# Patient Record
Sex: MALE | Race: BLACK OR AFRICAN AMERICAN | NOT HISPANIC OR LATINO | Employment: OTHER | RURAL
[De-identification: names, ages, dates, MRNs, and addresses within clinical notes are randomized per-mention and may not be internally consistent; named-entity substitution may affect disease eponyms.]

---

## 2020-03-11 ENCOUNTER — HISTORICAL (OUTPATIENT)
Dept: ADMINISTRATIVE | Facility: HOSPITAL | Age: 70
End: 2020-03-11

## 2020-03-11 LAB
ALBUMIN SERPL BCP-MCNC: 3.4 G/DL (ref 3.5–5)
ALBUMIN/GLOB SERPL: 0.9 {RATIO}
ALP SERPL-CCNC: 78 U/L (ref 45–115)
ALT SERPL W P-5'-P-CCNC: 34 U/L (ref 16–61)
AMYLASE SERPL-CCNC: 151 U/L (ref 25–115)
APTT PPP: 28.4 SECONDS (ref 25.2–37.3)
AST SERPL W P-5'-P-CCNC: 34 U/L (ref 15–37)
BASOPHILS # BLD AUTO: 0.01 X10E3/UL (ref 0–0.2)
BASOPHILS NFR BLD AUTO: 0.2 % (ref 0–1)
BILIRUB SERPL-MCNC: 0.6 MG/DL (ref 0–1.2)
BILIRUB UR QL STRIP: NEGATIVE MG/DL
BUN SERPL-MCNC: 7 MG/DL (ref 7–18)
BUN/CREAT SERPL: 7.4
CALCIUM SERPL-MCNC: 8.5 MG/DL (ref 8.5–10.1)
CHLORIDE SERPL-SCNC: 105 MMOL/L (ref 98–107)
CK MB SERPL-MCNC: <1 NG/ML (ref 1–3.6)
CK SERPL-CCNC: 142 U/L (ref 39–308)
CLARITY UR: CLEAR
CO2 SERPL-SCNC: 26 MMOL/L (ref 21–32)
COLOR UR: ABNORMAL
CREAT SERPL-MCNC: 0.95 MG/DL (ref 0.7–1.3)
EOSINOPHIL # BLD AUTO: 0.1 X10E3/UL (ref 0–0.5)
EOSINOPHIL NFR BLD AUTO: 2.1 % (ref 1–4)
EOSINOPHIL NFR BLD MANUAL: 1 % (ref 1–4)
ERYTHROCYTE [DISTWIDTH] IN BLOOD BY AUTOMATED COUNT: 11.4 % (ref 11.5–14.5)
GLOBULIN SER-MCNC: 3.8 G/DL (ref 2–4)
GLUCOSE SERPL-MCNC: 138 MG/DL (ref 74–106)
GLUCOSE UR STRIP-MCNC: NEGATIVE MG/DL
HCT VFR BLD AUTO: 39.6 % (ref 40–54)
HGB BLD-MCNC: 13.1 G/DL (ref 13.5–18)
IMM GRANULOCYTES # BLD AUTO: 0 X10E3/UL (ref 0–0.04)
IMM GRANULOCYTES NFR BLD: 0 % (ref 0–0.4)
INR BLD: 1 (ref 0–3.3)
KETONES UR STRIP-SCNC: NEGATIVE MG/DL
LEUKOCYTE ESTERASE UR QL STRIP: NEGATIVE LEU/UL
LIPASE SERPL-CCNC: 491 U/L (ref 73–393)
LYMPHOCYTES # BLD AUTO: 3.26 X10E3/UL (ref 1–4.8)
LYMPHOCYTES NFR BLD AUTO: 67.6 % (ref 27–41)
LYMPHOCYTES NFR BLD MANUAL: 71 % (ref 27–41)
MAGNESIUM SERPL-MCNC: 2.2 MG/DL (ref 1.7–2.3)
MCH RBC QN AUTO: 33.2 PG (ref 27–31)
MCHC RBC AUTO-ENTMCNC: 33.1 G/DL (ref 32–36)
MCV RBC AUTO: 100.5 FL (ref 80–96)
MONOCYTES # BLD AUTO: 0.38 X10E3/UL (ref 0–0.8)
MONOCYTES NFR BLD AUTO: 7.9 % (ref 2–6)
MONOCYTES NFR BLD MANUAL: 7 % (ref 2–6)
MPC BLD CALC-MCNC: 10.2 FL (ref 9.4–12.4)
MYOGLOBIN SERPL-MCNC: 64 NG/ML (ref 16–116)
NEUTROPHILS # BLD AUTO: 1.07 X10E3/UL (ref 1.8–7.7)
NEUTROPHILS NFR BLD AUTO: 22.2 % (ref 53–65)
NEUTS BAND NFR BLD MANUAL: 1 % (ref 1–5)
NEUTS SEG NFR BLD MANUAL: 20 % (ref 50–62)
NITRITE UR QL STRIP: NEGATIVE
NRBC # BLD AUTO: 0 X10E3/UL (ref 0–0)
NRBC, AUTO (.00): 0 /100 (ref 0–0)
PH UR STRIP: 7.5 PH UNITS (ref 5–8)
PLATELET # BLD AUTO: 134 X10E3/UL (ref 150–400)
PLATELET MORPHOLOGY: ABNORMAL
POTASSIUM SERPL-SCNC: 3.4 MMOL/L (ref 3.5–5.1)
PROT SERPL-MCNC: 7.2 G/DL (ref 6.4–8.2)
PROT UR QL STRIP: NEGATIVE MG/DL
PROTHROMBIN TIME: 13.3 SECONDS (ref 11.7–14.7)
RBC # BLD AUTO: 3.94 X10E6/UL (ref 4.6–6.2)
RBC # UR STRIP: NEGATIVE ERY/UL
RBC MORPH BLD: NORMAL
SODIUM SERPL-SCNC: 139 MMOL/L (ref 136–145)
SP GR UR STRIP: 1.01 (ref 1–1.03)
TROPONIN I SERPL-MCNC: <0.017 NG/ML (ref 0–0.06)
UROBILINOGEN UR STRIP-ACNC: 1 EU/DL
WBC # BLD AUTO: 4.82 X10E3/UL (ref 4.5–11)

## 2021-06-03 ENCOUNTER — OFFICE VISIT (OUTPATIENT)
Dept: FAMILY MEDICINE | Facility: CLINIC | Age: 71
End: 2021-06-03
Payer: COMMERCIAL

## 2021-06-03 ENCOUNTER — TELEPHONE (OUTPATIENT)
Dept: FAMILY MEDICINE | Facility: CLINIC | Age: 71
End: 2021-06-03

## 2021-06-03 VITALS
DIASTOLIC BLOOD PRESSURE: 80 MMHG | HEIGHT: 62 IN | SYSTOLIC BLOOD PRESSURE: 130 MMHG | TEMPERATURE: 99 F | BODY MASS INDEX: 28.71 KG/M2 | HEART RATE: 67 BPM | OXYGEN SATURATION: 98 % | WEIGHT: 156 LBS

## 2021-06-03 DIAGNOSIS — J44.9 CHRONIC OBSTRUCTIVE PULMONARY DISEASE, UNSPECIFIED COPD TYPE: ICD-10-CM

## 2021-06-03 DIAGNOSIS — M19.012 PRIMARY OSTEOARTHRITIS OF LEFT SHOULDER: Primary | ICD-10-CM

## 2021-06-03 DIAGNOSIS — I10 ESSENTIAL HYPERTENSION: ICD-10-CM

## 2021-06-03 LAB
ANION GAP SERPL CALCULATED.3IONS-SCNC: 12 MMOL/L (ref 7–16)
BASOPHILS # BLD AUTO: 0.03 K/UL (ref 0–0.2)
BASOPHILS NFR BLD AUTO: 0.6 % (ref 0–1)
BUN SERPL-MCNC: 10 MG/DL (ref 7–18)
BUN/CREAT SERPL: 11 (ref 6–20)
CALCIUM SERPL-MCNC: 8.7 MG/DL (ref 8.5–10.1)
CHLORIDE SERPL-SCNC: 105 MMOL/L (ref 98–107)
CHOLEST SERPL-MCNC: 148 MG/DL (ref 0–200)
CHOLEST/HDLC SERPL: 3.9 {RATIO}
CO2 SERPL-SCNC: 26 MMOL/L (ref 21–32)
CREAT SERPL-MCNC: 0.95 MG/DL (ref 0.7–1.3)
DIFFERENTIAL METHOD BLD: ABNORMAL
EOSINOPHIL # BLD AUTO: 0.08 K/UL (ref 0–0.5)
EOSINOPHIL NFR BLD AUTO: 1.5 % (ref 1–4)
ERYTHROCYTE [DISTWIDTH] IN BLOOD BY AUTOMATED COUNT: 12 % (ref 11.5–14.5)
GLUCOSE SERPL-MCNC: 95 MG/DL (ref 74–106)
HCT VFR BLD AUTO: 40.1 % (ref 40–54)
HDLC SERPL-MCNC: 38 MG/DL (ref 40–60)
HGB BLD-MCNC: 13.3 G/DL (ref 13.5–18)
IMM GRANULOCYTES # BLD AUTO: 0.01 K/UL (ref 0–0.04)
IMM GRANULOCYTES NFR BLD: 0.2 % (ref 0–0.4)
LDLC SERPL CALC-MCNC: 96 MG/DL
LDLC/HDLC SERPL: 2.5 {RATIO}
LYMPHOCYTES # BLD AUTO: 3.13 K/UL (ref 1–4.8)
LYMPHOCYTES NFR BLD AUTO: 60.3 % (ref 27–41)
MCH RBC QN AUTO: 32.3 PG (ref 27–31)
MCHC RBC AUTO-ENTMCNC: 33.2 G/DL (ref 32–36)
MCV RBC AUTO: 97.3 FL (ref 80–96)
MONOCYTES # BLD AUTO: 0.51 K/UL (ref 0–0.8)
MONOCYTES NFR BLD AUTO: 9.8 % (ref 2–6)
MPC BLD CALC-MCNC: 10.9 FL (ref 9.4–12.4)
NEUTROPHILS # BLD AUTO: 1.43 K/UL (ref 1.8–7.7)
NEUTROPHILS NFR BLD AUTO: 27.6 % (ref 53–65)
NONHDLC SERPL-MCNC: 110 MG/DL
NRBC # BLD AUTO: 0 X10E3/UL
NRBC, AUTO (.00): 0 %
PLATELET # BLD AUTO: 143 K/UL (ref 150–400)
POTASSIUM SERPL-SCNC: 3.7 MMOL/L (ref 3.5–5.1)
RBC # BLD AUTO: 4.12 M/UL (ref 4.6–6.2)
SODIUM SERPL-SCNC: 139 MMOL/L (ref 136–145)
TRIGL SERPL-MCNC: 70 MG/DL (ref 35–150)
VLDLC SERPL-MCNC: 14 MG/DL
WBC # BLD AUTO: 5.19 K/UL (ref 4.5–11)

## 2021-06-03 PROCEDURE — 96372 PR INJECTION,THERAP/PROPH/DIAG2ST, IM OR SUBCUT: ICD-10-PCS | Mod: ,,, | Performed by: FAMILY MEDICINE

## 2021-06-03 PROCEDURE — 80061 LIPID PANEL: ICD-10-PCS | Mod: ,,, | Performed by: CLINICAL MEDICAL LABORATORY

## 2021-06-03 PROCEDURE — 80048 BASIC METABOLIC PANEL: ICD-10-PCS | Mod: ,,, | Performed by: CLINICAL MEDICAL LABORATORY

## 2021-06-03 PROCEDURE — 99214 PR OFFICE/OUTPT VISIT, EST, LEVL IV, 30-39 MIN: ICD-10-PCS | Mod: 25,,, | Performed by: FAMILY MEDICINE

## 2021-06-03 PROCEDURE — 80061 LIPID PANEL: CPT | Mod: ,,, | Performed by: CLINICAL MEDICAL LABORATORY

## 2021-06-03 PROCEDURE — 99214 OFFICE O/P EST MOD 30 MIN: CPT | Mod: 25,,, | Performed by: FAMILY MEDICINE

## 2021-06-03 PROCEDURE — 80048 BASIC METABOLIC PNL TOTAL CA: CPT | Mod: ,,, | Performed by: CLINICAL MEDICAL LABORATORY

## 2021-06-03 PROCEDURE — 85025 COMPLETE CBC W/AUTO DIFF WBC: CPT | Mod: ,,, | Performed by: CLINICAL MEDICAL LABORATORY

## 2021-06-03 PROCEDURE — 96372 THER/PROPH/DIAG INJ SC/IM: CPT | Mod: ,,, | Performed by: FAMILY MEDICINE

## 2021-06-03 PROCEDURE — 85025 CBC WITH DIFFERENTIAL: ICD-10-PCS | Mod: ,,, | Performed by: CLINICAL MEDICAL LABORATORY

## 2021-06-03 RX ORDER — ALBUTEROL SULFATE 90 UG/1
1 AEROSOL, METERED RESPIRATORY (INHALATION) DAILY PRN
COMMUNITY
Start: 2021-01-29 | End: 2021-06-03 | Stop reason: SDUPTHER

## 2021-06-03 RX ORDER — AMLODIPINE BESYLATE 10 MG/1
10 TABLET ORAL DAILY
Qty: 90 TABLET | Refills: 0 | Status: SHIPPED | OUTPATIENT
Start: 2021-06-03 | End: 2021-11-29 | Stop reason: SDUPTHER

## 2021-06-03 RX ORDER — ALBUTEROL SULFATE 90 UG/1
1 AEROSOL, METERED RESPIRATORY (INHALATION) DAILY PRN
Qty: 18 G | Refills: 1 | Status: SHIPPED | OUTPATIENT
Start: 2021-06-03 | End: 2021-09-02 | Stop reason: SDUPTHER

## 2021-06-03 RX ORDER — NAPROXEN 500 MG/1
500 TABLET ORAL 2 TIMES DAILY PRN
Qty: 30 TABLET | Refills: 2 | Status: SHIPPED | OUTPATIENT
Start: 2021-06-03 | End: 2022-10-18 | Stop reason: SDUPTHER

## 2021-06-03 RX ORDER — KETOROLAC TROMETHAMINE 30 MG/ML
30 INJECTION, SOLUTION INTRAMUSCULAR; INTRAVENOUS
Status: COMPLETED | OUTPATIENT
Start: 2021-06-03 | End: 2021-06-03

## 2021-06-03 RX ORDER — AMLODIPINE BESYLATE 10 MG/1
10 TABLET ORAL DAILY
COMMUNITY
Start: 2021-05-13 | End: 2021-06-03 | Stop reason: SDUPTHER

## 2021-06-03 RX ORDER — ACETAMINOPHEN AND CODEINE PHOSPHATE 300; 30 MG/1; MG/1
1 TABLET ORAL EVERY 6 HOURS PRN
COMMUNITY
Start: 2021-04-06 | End: 2022-10-18 | Stop reason: ALTCHOICE

## 2021-06-03 RX ADMIN — KETOROLAC TROMETHAMINE 30 MG: 30 INJECTION, SOLUTION INTRAMUSCULAR; INTRAVENOUS at 08:06

## 2021-09-02 ENCOUNTER — TELEPHONE (OUTPATIENT)
Dept: FAMILY MEDICINE | Facility: CLINIC | Age: 71
End: 2021-09-02

## 2021-09-02 ENCOUNTER — OFFICE VISIT (OUTPATIENT)
Dept: FAMILY MEDICINE | Facility: CLINIC | Age: 71
End: 2021-09-02
Payer: COMMERCIAL

## 2021-09-02 VITALS
HEART RATE: 60 BPM | TEMPERATURE: 99 F | OXYGEN SATURATION: 99 % | BODY MASS INDEX: 27.6 KG/M2 | WEIGHT: 150 LBS | DIASTOLIC BLOOD PRESSURE: 60 MMHG | SYSTOLIC BLOOD PRESSURE: 128 MMHG | HEIGHT: 62 IN

## 2021-09-02 DIAGNOSIS — J44.9 CHRONIC OBSTRUCTIVE PULMONARY DISEASE, UNSPECIFIED COPD TYPE: ICD-10-CM

## 2021-09-02 DIAGNOSIS — R05.9 COUGH: Primary | ICD-10-CM

## 2021-09-02 DIAGNOSIS — I10 ESSENTIAL HYPERTENSION: ICD-10-CM

## 2021-09-02 PROCEDURE — 99214 PR OFFICE/OUTPT VISIT, EST, LEVL IV, 30-39 MIN: ICD-10-PCS | Mod: ,,, | Performed by: FAMILY MEDICINE

## 2021-09-02 PROCEDURE — 99214 OFFICE O/P EST MOD 30 MIN: CPT | Mod: ,,, | Performed by: FAMILY MEDICINE

## 2021-09-02 RX ORDER — PREDNISONE 20 MG/1
20 TABLET ORAL DAILY
Qty: 5 TABLET | Refills: 0 | Status: SHIPPED | OUTPATIENT
Start: 2021-09-02 | End: 2022-10-18 | Stop reason: ALTCHOICE

## 2021-09-02 RX ORDER — AZITHROMYCIN 250 MG/1
TABLET, FILM COATED ORAL
Qty: 6 TABLET | Refills: 0 | Status: SHIPPED | OUTPATIENT
Start: 2021-09-02 | End: 2021-09-07

## 2021-09-02 RX ORDER — ALBUTEROL SULFATE 90 UG/1
1 AEROSOL, METERED RESPIRATORY (INHALATION) DAILY PRN
Qty: 18 G | Refills: 1 | Status: SHIPPED | OUTPATIENT
Start: 2021-09-02 | End: 2022-02-15 | Stop reason: SDUPTHER

## 2021-09-02 RX ORDER — BENZONATATE 100 MG/1
CAPSULE ORAL
Qty: 30 CAPSULE | Refills: 0 | Status: SHIPPED | OUTPATIENT
Start: 2021-09-02 | End: 2022-01-07 | Stop reason: SDUPTHER

## 2021-11-29 RX ORDER — AMLODIPINE BESYLATE 10 MG/1
10 TABLET ORAL DAILY
Qty: 90 TABLET | Refills: 0 | Status: SHIPPED | OUTPATIENT
Start: 2021-11-29 | End: 2022-04-12 | Stop reason: SDUPTHER

## 2022-01-07 ENCOUNTER — OFFICE VISIT (OUTPATIENT)
Dept: FAMILY MEDICINE | Facility: CLINIC | Age: 72
End: 2022-01-07
Payer: COMMERCIAL

## 2022-01-07 VITALS
HEIGHT: 63 IN | WEIGHT: 152 LBS | HEART RATE: 75 BPM | DIASTOLIC BLOOD PRESSURE: 72 MMHG | SYSTOLIC BLOOD PRESSURE: 120 MMHG | OXYGEN SATURATION: 99 % | BODY MASS INDEX: 26.93 KG/M2 | TEMPERATURE: 98 F

## 2022-01-07 DIAGNOSIS — M54.50 CHRONIC BILATERAL LOW BACK PAIN WITHOUT SCIATICA: ICD-10-CM

## 2022-01-07 DIAGNOSIS — G89.29 CHRONIC BILATERAL LOW BACK PAIN WITHOUT SCIATICA: ICD-10-CM

## 2022-01-07 DIAGNOSIS — M19.012 PRIMARY OSTEOARTHRITIS OF LEFT SHOULDER: Primary | ICD-10-CM

## 2022-01-07 PROCEDURE — 3008F BODY MASS INDEX DOCD: CPT | Mod: ,,, | Performed by: FAMILY MEDICINE

## 2022-01-07 PROCEDURE — 1160F PR REVIEW ALL MEDS BY PRESCRIBER/CLIN PHARMACIST DOCUMENTED: ICD-10-PCS | Mod: ,,, | Performed by: FAMILY MEDICINE

## 2022-01-07 PROCEDURE — 3008F PR BODY MASS INDEX (BMI) DOCUMENTED: ICD-10-PCS | Mod: ,,, | Performed by: FAMILY MEDICINE

## 2022-01-07 PROCEDURE — 3078F PR MOST RECENT DIASTOLIC BLOOD PRESSURE < 80 MM HG: ICD-10-PCS | Mod: ,,, | Performed by: FAMILY MEDICINE

## 2022-01-07 PROCEDURE — 3074F PR MOST RECENT SYSTOLIC BLOOD PRESSURE < 130 MM HG: ICD-10-PCS | Mod: ,,, | Performed by: FAMILY MEDICINE

## 2022-01-07 PROCEDURE — 3074F SYST BP LT 130 MM HG: CPT | Mod: ,,, | Performed by: FAMILY MEDICINE

## 2022-01-07 PROCEDURE — 99213 OFFICE O/P EST LOW 20 MIN: CPT | Mod: 25,,, | Performed by: FAMILY MEDICINE

## 2022-01-07 PROCEDURE — 96372 THER/PROPH/DIAG INJ SC/IM: CPT | Mod: ,,, | Performed by: FAMILY MEDICINE

## 2022-01-07 PROCEDURE — 1159F PR MEDICATION LIST DOCUMENTED IN MEDICAL RECORD: ICD-10-PCS | Mod: ,,, | Performed by: FAMILY MEDICINE

## 2022-01-07 PROCEDURE — 96372 PR INJECTION,THERAP/PROPH/DIAG2ST, IM OR SUBCUT: ICD-10-PCS | Mod: ,,, | Performed by: FAMILY MEDICINE

## 2022-01-07 PROCEDURE — 99213 PR OFFICE/OUTPT VISIT, EST, LEVL III, 20-29 MIN: ICD-10-PCS | Mod: 25,,, | Performed by: FAMILY MEDICINE

## 2022-01-07 PROCEDURE — 1159F MED LIST DOCD IN RCRD: CPT | Mod: ,,, | Performed by: FAMILY MEDICINE

## 2022-01-07 PROCEDURE — 1160F RVW MEDS BY RX/DR IN RCRD: CPT | Mod: ,,, | Performed by: FAMILY MEDICINE

## 2022-01-07 PROCEDURE — 3078F DIAST BP <80 MM HG: CPT | Mod: ,,, | Performed by: FAMILY MEDICINE

## 2022-01-07 RX ORDER — NAPROXEN 500 MG/1
500 TABLET ORAL 2 TIMES DAILY
Qty: 20 TABLET | Refills: 0 | Status: SHIPPED | OUTPATIENT
Start: 2022-01-07 | End: 2022-04-14 | Stop reason: SDUPTHER

## 2022-01-07 RX ORDER — BENZONATATE 100 MG/1
CAPSULE ORAL
Qty: 30 CAPSULE | Refills: 0 | Status: SHIPPED | OUTPATIENT
Start: 2022-01-07 | End: 2023-03-21 | Stop reason: ALTCHOICE

## 2022-01-07 RX ORDER — KETOROLAC TROMETHAMINE 30 MG/ML
30 INJECTION, SOLUTION INTRAMUSCULAR; INTRAVENOUS
Status: COMPLETED | OUTPATIENT
Start: 2022-01-07 | End: 2022-01-07

## 2022-01-07 RX ADMIN — KETOROLAC TROMETHAMINE 30 MG: 30 INJECTION, SOLUTION INTRAMUSCULAR; INTRAVENOUS at 10:01

## 2022-01-07 NOTE — PROGRESS NOTES
[facility] Medicine    Chief Complaint      Chief Complaint   Patient presents with    Arthritis     Back and shoulder arthritis       History of Present Illness      Jeff Starr is a 71 y.o. male with chronic conditions of HTN, COPD, and osteoarthritis who presents today for left shoulder and low back pain.  Patient seen for similar in the past and treated with IM Toradol and p.o. naproxen.  States he noted significant relief of symptoms, but has been out of naproxen.  Denies new injury.    Past Medical History:  Past Medical History:   Diagnosis Date    HTN (hypertension)        Past Surgical History:   has no past surgical history on file.    Social History:  Social History     Tobacco Use    Smoking status: Current Every Day Smoker     Packs/day: 1.00     Types: Cigarettes    Smokeless tobacco: Never Used   Substance Use Topics    Alcohol use: Not Currently    Drug use: Never       I personally reviewed all past medical, surgical, and social.     Review of Systems   Constitutional: Negative for fatigue and fever.   HENT: Negative for ear pain.    Eyes: Negative for pain and visual disturbance.   Respiratory: Negative for chest tightness and shortness of breath.    Cardiovascular: Negative for chest pain and leg swelling.   Gastrointestinal: Negative for abdominal pain.   Genitourinary: Negative for difficulty urinating.   Musculoskeletal: Positive for arthralgias and back pain. Negative for gait problem and myalgias.   Skin: Negative for rash.   Neurological: Negative for dizziness and light-headedness.   Hematological: Does not bruise/bleed easily.        Medications:  Outpatient Encounter Medications as of 1/7/2022   Medication Sig Dispense Refill    acetaminophen-codeine 300-30mg (TYLENOL #3) 300-30 mg Tab Take 1 tablet by mouth every 6 (six) hours as needed.      albuterol (PROVENTIL/VENTOLIN HFA) 90 mcg/actuation inhaler Inhale 1 puff into the lungs daily as needed for Shortness of Breath. 18  "g 1    amLODIPine (NORVASC) 10 MG tablet Take 1 tablet (10 mg total) by mouth once daily. 90 tablet 0    [DISCONTINUED] benzonatate (TESSALON) 100 MG capsule One to two capsules three times daily as needed for cough 30 capsule 0    benzonatate (TESSALON) 100 MG capsule One to two capsules three times daily as needed for cough 30 capsule 0    naproxen (NAPROSYN) 500 MG tablet Take 1 tablet (500 mg total) by mouth 2 (two) times daily as needed (pain). (Patient not taking: Reported on 1/7/2022) 30 tablet 2    naproxen (NAPROSYN) 500 MG tablet Take 1 tablet (500 mg total) by mouth 2 (two) times daily. 20 tablet 0    predniSONE (DELTASONE) 20 MG tablet Take 1 tablet (20 mg total) by mouth once daily. (Patient not taking: Reported on 1/7/2022) 5 tablet 0     Facility-Administered Encounter Medications as of 1/7/2022   Medication Dose Route Frequency Provider Last Rate Last Admin    [COMPLETED] ketorolac injection 30 mg  30 mg Intramuscular 1 time in Clinic/HOD Negrito Soliman, DO   30 mg at 01/07/22 1054       Allergies:  Review of patient's allergies indicates:  No Known Allergies    Health Maintenance:    There is no immunization history on file for this patient.   Health Maintenance   Topic Date Due    Hepatitis C Screening  Never done    TETANUS VACCINE  Never done    Abdominal Aortic Aneurysm Screening  Never done    Lipid Panel  06/03/2026        Physical Exam      Vital Signs  Temp: 97.6 °F (36.4 °C)  Pulse: 75  SpO2: 99 %  BP: 120/72  BP Location: Left arm  Patient Position: Sitting  Height and Weight  Height: 5' 3" (160 cm)  Weight: 68.9 kg (152 lb)  BSA (Calculated - sq m): 1.75 sq meters  BMI (Calculated): 26.9  Weight in (lb) to have BMI = 25: 140.8]    Physical Exam  Constitutional:       Appearance: Normal appearance.   HENT:      Head: Normocephalic.   Eyes:      Conjunctiva/sclera: Conjunctivae normal.      Pupils: Pupils are equal, round, and reactive to light.   Cardiovascular:      Rate " and Rhythm: Normal rate and regular rhythm.      Pulses: Normal pulses.   Pulmonary:      Effort: Pulmonary effort is normal.      Breath sounds: Normal breath sounds.   Abdominal:      General: Bowel sounds are normal. There is no distension.      Palpations: Abdomen is soft.   Musculoskeletal:         General: Normal range of motion.      Left shoulder: Tenderness present.      Lumbar back: Tenderness present.      Right lower leg: No edema.      Left lower leg: No edema.   Skin:     General: Skin is warm and dry.   Neurological:      General: No focal deficit present.      Mental Status: He is alert and oriented to person, place, and time.   Psychiatric:         Mood and Affect: Mood normal.          Laboratory:  CBC:  Recent Labs   Lab 03/11/20  0900 03/11/20  0900 06/03/21  0839   WBC 4.82   < > 5.19   RBC 3.94 L   < > 4.12 L   Hemoglobin 13.1 L   < > 13.3 L   Hematocrit 39.6 L   < > 40.1   Platelet Count 134 L   < > 143 L   .5 H   < > 97.3 H   MCH 33.2 H   < > 32.3 H   MCHC 33.1  --  33.2    < > = values in this interval not displayed.     CMP:  Recent Labs   Lab 03/11/20  0900 03/11/20  0900 06/03/21  0839   Glucose 138 H   < > 95   Calcium 8.5   < > 8.7   Albumin 3.4 L  --   --    Total Protein 7.2  --   --    Sodium 139   < > 139   Potassium 3.4 L   < > 3.7   CO2 26   < > 26   Chloride 105   < > 105   BUN 7   < > 10   Alk Phos 78  --   --    ALT 34  --   --    AST 34  --   --    Bilirubin, Total 0.6  --   --     < > = values in this interval not displayed.     LIPIDS:  Recent Labs   Lab 06/03/21  0839   HDL Cholesterol 38 L   Cholesterol 148   Triglycerides 70   LDL Calculated 96   Cholesterol/HDL Ratio (Risk Factor) 3.9   Non-     TSH:      A1C:        Assessment/Plan     Jeff Starr is a 71 y.o.male with:     1. Primary osteoarthritis of left shoulder  - ketorolac injection 30 mg  - naproxen 500 mg b.i.d. p.r.n.    2. Chronic bilateral low back pain without sciatica  - Tx per  #1    Total time spent face-to-face and non-face-to-face coordinating care for this encounter was: 20 min    Chronic conditions status updated as per HPI.  Other than changes above, cont current medications and maintain follow up with specialists.  Return to clinic PRN.    Negrito Soliman DO  [facility]

## 2022-02-15 RX ORDER — ALBUTEROL SULFATE 90 UG/1
1 AEROSOL, METERED RESPIRATORY (INHALATION) DAILY PRN
Qty: 18 G | Refills: 1 | Status: SHIPPED | OUTPATIENT
Start: 2022-02-15 | End: 2022-04-14 | Stop reason: SDUPTHER

## 2022-04-12 RX ORDER — AMLODIPINE BESYLATE 10 MG/1
10 TABLET ORAL DAILY
Qty: 90 TABLET | Refills: 0 | Status: SHIPPED | OUTPATIENT
Start: 2022-04-12 | End: 2022-07-14 | Stop reason: SDUPTHER

## 2022-04-14 ENCOUNTER — OFFICE VISIT (OUTPATIENT)
Dept: FAMILY MEDICINE | Facility: CLINIC | Age: 72
End: 2022-04-14
Payer: COMMERCIAL

## 2022-04-14 VITALS
OXYGEN SATURATION: 98 % | DIASTOLIC BLOOD PRESSURE: 70 MMHG | BODY MASS INDEX: 28.35 KG/M2 | WEIGHT: 160 LBS | TEMPERATURE: 98 F | SYSTOLIC BLOOD PRESSURE: 130 MMHG | HEART RATE: 78 BPM | HEIGHT: 63 IN

## 2022-04-14 DIAGNOSIS — J44.9 CHRONIC OBSTRUCTIVE PULMONARY DISEASE, UNSPECIFIED COPD TYPE: ICD-10-CM

## 2022-04-14 DIAGNOSIS — M25.50 MULTIPLE JOINT PAIN: ICD-10-CM

## 2022-04-14 DIAGNOSIS — I10 ESSENTIAL HYPERTENSION: Primary | ICD-10-CM

## 2022-04-14 LAB
ANION GAP SERPL CALCULATED.3IONS-SCNC: 7 MMOL/L (ref 7–16)
BASOPHILS # BLD AUTO: 0.02 K/UL (ref 0–0.2)
BASOPHILS NFR BLD AUTO: 0.4 % (ref 0–1)
BUN SERPL-MCNC: 13 MG/DL (ref 7–18)
BUN/CREAT SERPL: 14 (ref 6–20)
CALCIUM SERPL-MCNC: 8.5 MG/DL (ref 8.5–10.1)
CHLORIDE SERPL-SCNC: 111 MMOL/L (ref 98–107)
CHOLEST SERPL-MCNC: 151 MG/DL (ref 0–200)
CHOLEST/HDLC SERPL: 4 {RATIO}
CO2 SERPL-SCNC: 26 MMOL/L (ref 21–32)
CREAT SERPL-MCNC: 0.94 MG/DL (ref 0.7–1.3)
CRP SERPL-MCNC: 0.61 MG/DL (ref 0–0.8)
DIFFERENTIAL METHOD BLD: ABNORMAL
EOSINOPHIL # BLD AUTO: 0.12 K/UL (ref 0–0.5)
EOSINOPHIL NFR BLD AUTO: 2.2 % (ref 1–4)
ERYTHROCYTE [DISTWIDTH] IN BLOOD BY AUTOMATED COUNT: 11.9 % (ref 11.5–14.5)
ERYTHROCYTE [SEDIMENTATION RATE] IN BLOOD BY WESTERGREN METHOD: 15 MM/HR (ref 0–20)
GLUCOSE SERPL-MCNC: 95 MG/DL (ref 74–106)
HCT VFR BLD AUTO: 40.2 % (ref 40–54)
HDLC SERPL-MCNC: 38 MG/DL (ref 40–60)
HGB BLD-MCNC: 13.1 G/DL (ref 13.5–18)
IMM GRANULOCYTES # BLD AUTO: 0.02 K/UL (ref 0–0.04)
IMM GRANULOCYTES NFR BLD: 0.4 % (ref 0–0.4)
LDLC SERPL CALC-MCNC: 97 MG/DL
LDLC/HDLC SERPL: 2.6 {RATIO}
LYMPHOCYTES # BLD AUTO: 2.7 K/UL (ref 1–4.8)
LYMPHOCYTES NFR BLD AUTO: 50.1 % (ref 27–41)
MCH RBC QN AUTO: 32.7 PG (ref 27–31)
MCHC RBC AUTO-ENTMCNC: 32.6 G/DL (ref 32–36)
MCV RBC AUTO: 100.2 FL (ref 80–96)
MONOCYTES # BLD AUTO: 0.59 K/UL (ref 0–0.8)
MONOCYTES NFR BLD AUTO: 10.9 % (ref 2–6)
MPC BLD CALC-MCNC: 11.6 FL (ref 9.4–12.4)
NEUTROPHILS # BLD AUTO: 1.94 K/UL (ref 1.8–7.7)
NEUTROPHILS NFR BLD AUTO: 36 % (ref 53–65)
NONHDLC SERPL-MCNC: 113 MG/DL
NRBC # BLD AUTO: 0 X10E3/UL
NRBC, AUTO (.00): 0 %
PLATELET # BLD AUTO: 144 K/UL (ref 150–400)
POTASSIUM SERPL-SCNC: 4.2 MMOL/L (ref 3.5–5.1)
RBC # BLD AUTO: 4.01 M/UL (ref 4.6–6.2)
RHEUMATOID FACT SER NEPH-ACNC: <10 IU/ML (ref 0–15)
SODIUM SERPL-SCNC: 140 MMOL/L (ref 136–145)
TRIGL SERPL-MCNC: 79 MG/DL (ref 35–150)
VLDLC SERPL-MCNC: 16 MG/DL
WBC # BLD AUTO: 5.39 K/UL (ref 4.5–11)

## 2022-04-14 PROCEDURE — 80048 BASIC METABOLIC PNL TOTAL CA: CPT | Mod: ,,, | Performed by: CLINICAL MEDICAL LABORATORY

## 2022-04-14 PROCEDURE — 1159F MED LIST DOCD IN RCRD: CPT | Mod: ,,, | Performed by: FAMILY MEDICINE

## 2022-04-14 PROCEDURE — 99214 PR OFFICE/OUTPT VISIT, EST, LEVL IV, 30-39 MIN: ICD-10-PCS | Mod: ,,, | Performed by: FAMILY MEDICINE

## 2022-04-14 PROCEDURE — 3078F DIAST BP <80 MM HG: CPT | Mod: ,,, | Performed by: FAMILY MEDICINE

## 2022-04-14 PROCEDURE — 86038 MAYO GENERIC ORDERABLE: ICD-10-PCS | Mod: 90,,, | Performed by: CLINICAL MEDICAL LABORATORY

## 2022-04-14 PROCEDURE — 1159F PR MEDICATION LIST DOCUMENTED IN MEDICAL RECORD: ICD-10-PCS | Mod: ,,, | Performed by: FAMILY MEDICINE

## 2022-04-14 PROCEDURE — 85025 COMPLETE CBC W/AUTO DIFF WBC: CPT | Mod: ,,, | Performed by: CLINICAL MEDICAL LABORATORY

## 2022-04-14 PROCEDURE — 3075F PR MOST RECENT SYSTOLIC BLOOD PRESS GE 130-139MM HG: ICD-10-PCS | Mod: ,,, | Performed by: FAMILY MEDICINE

## 2022-04-14 PROCEDURE — 80061 LIPID PANEL: CPT | Mod: ,,, | Performed by: CLINICAL MEDICAL LABORATORY

## 2022-04-14 PROCEDURE — 1160F PR REVIEW ALL MEDS BY PRESCRIBER/CLIN PHARMACIST DOCUMENTED: ICD-10-PCS | Mod: ,,, | Performed by: FAMILY MEDICINE

## 2022-04-14 PROCEDURE — 3008F BODY MASS INDEX DOCD: CPT | Mod: ,,, | Performed by: FAMILY MEDICINE

## 2022-04-14 PROCEDURE — 85025 CBC WITH DIFFERENTIAL: ICD-10-PCS | Mod: ,,, | Performed by: CLINICAL MEDICAL LABORATORY

## 2022-04-14 PROCEDURE — 3078F PR MOST RECENT DIASTOLIC BLOOD PRESSURE < 80 MM HG: ICD-10-PCS | Mod: ,,, | Performed by: FAMILY MEDICINE

## 2022-04-14 PROCEDURE — 86140 C-REACTIVE PROTEIN: ICD-10-PCS | Mod: ,,, | Performed by: CLINICAL MEDICAL LABORATORY

## 2022-04-14 PROCEDURE — 86038 ANTINUCLEAR ANTIBODIES: CPT | Mod: 90,,, | Performed by: CLINICAL MEDICAL LABORATORY

## 2022-04-14 PROCEDURE — 99214 OFFICE O/P EST MOD 30 MIN: CPT | Mod: ,,, | Performed by: FAMILY MEDICINE

## 2022-04-14 PROCEDURE — 85651 RBC SED RATE NONAUTOMATED: CPT | Mod: ,,, | Performed by: CLINICAL MEDICAL LABORATORY

## 2022-04-14 PROCEDURE — 86431 RHEUMATOID FACTOR QUANT: CPT | Mod: ,,, | Performed by: CLINICAL MEDICAL LABORATORY

## 2022-04-14 PROCEDURE — 85651 SEDIMENTATION RATE, AUTOMATED: ICD-10-PCS | Mod: ,,, | Performed by: CLINICAL MEDICAL LABORATORY

## 2022-04-14 PROCEDURE — 80048 BASIC METABOLIC PANEL: ICD-10-PCS | Mod: ,,, | Performed by: CLINICAL MEDICAL LABORATORY

## 2022-04-14 PROCEDURE — 80061 LIPID PANEL: ICD-10-PCS | Mod: ,,, | Performed by: CLINICAL MEDICAL LABORATORY

## 2022-04-14 PROCEDURE — 86140 C-REACTIVE PROTEIN: CPT | Mod: ,,, | Performed by: CLINICAL MEDICAL LABORATORY

## 2022-04-14 PROCEDURE — 3075F SYST BP GE 130 - 139MM HG: CPT | Mod: ,,, | Performed by: FAMILY MEDICINE

## 2022-04-14 PROCEDURE — 86431 RHEUMATOID QUANTITATIVE: ICD-10-PCS | Mod: ,,, | Performed by: CLINICAL MEDICAL LABORATORY

## 2022-04-14 PROCEDURE — 1160F RVW MEDS BY RX/DR IN RCRD: CPT | Mod: ,,, | Performed by: FAMILY MEDICINE

## 2022-04-14 PROCEDURE — 3008F PR BODY MASS INDEX (BMI) DOCUMENTED: ICD-10-PCS | Mod: ,,, | Performed by: FAMILY MEDICINE

## 2022-04-14 RX ORDER — ALBUTEROL SULFATE 90 UG/1
1 AEROSOL, METERED RESPIRATORY (INHALATION) DAILY PRN
Qty: 18 G | Refills: 1 | Status: SHIPPED | OUTPATIENT
Start: 2022-04-14 | End: 2022-10-18 | Stop reason: SDUPTHER

## 2022-04-14 RX ORDER — NAPROXEN 500 MG/1
500 TABLET ORAL 2 TIMES DAILY
Qty: 20 TABLET | Refills: 2 | Status: SHIPPED | OUTPATIENT
Start: 2022-04-14 | End: 2023-03-21 | Stop reason: SDUPTHER

## 2022-04-14 RX ORDER — NEOMYCIN SULFATE, POLYMYXIN B SULFATE, AND DEXAMETHASONE 3.5; 10000; 1 MG/G; [USP'U]/G; MG/G
OINTMENT OPHTHALMIC
COMMUNITY
Start: 2021-12-21 | End: 2023-03-21 | Stop reason: ALTCHOICE

## 2022-04-14 NOTE — PROGRESS NOTES
[facility] Medicine    Chief Complaint      Chief Complaint   Patient presents with    Follow-up       History of Present Illness      Jeff Starr is a 71 y.o. male with chronic conditions of HTN, COPD, and osteoarthritis who presents today for routine follow up. Today has c/o multiple joint pain. Noted pain in right shoulder and elbow and left hand for 1 week duration. States he was diagnosed with rheumatoid arthritis while being seen at [facility] in [id number]. States rainy weather makes symptoms worse. Denies new injury/trauma. States symptoms improve with PRN naproxen. States other chronic conditions stable.    Past Medical History:  Past Medical History:   Diagnosis Date    HTN (hypertension)        Past Surgical History:   has no past surgical history on file.    Social History:  Social History     Tobacco Use    Smoking status: Current Every Day Smoker     Packs/day: 1.00     Types: Cigarettes    Smokeless tobacco: Never Used   Substance Use Topics    Alcohol use: Not Currently    Drug use: Never       I personally reviewed all past medical, surgical, and social.     Review of Systems   Constitutional: Negative for fatigue and fever.   HENT: Negative for ear pain.    Eyes: Negative for pain and visual disturbance.   Respiratory: Negative for chest tightness and shortness of breath.    Cardiovascular: Negative for chest pain and leg swelling.   Gastrointestinal: Negative for abdominal pain.   Genitourinary: Negative for difficulty urinating.   Musculoskeletal: Positive for arthralgias. Negative for gait problem and myalgias.   Skin: Negative for rash.   Neurological: Negative for dizziness and light-headedness.   Hematological: Does not bruise/bleed easily.        Medications:  Outpatient Encounter Medications as of 4/14/2022   Medication Sig Dispense Refill    acetaminophen-codeine 300-30mg (TYLENOL #3) 300-30 mg Tab Take 1 tablet by mouth every 6 (six) hours as needed.      amLODIPine  "(NORVASC) 10 MG tablet Take 1 tablet (10 mg total) by mouth once daily. 90 tablet 0    benzonatate (TESSALON) 100 MG capsule One to two capsules three times daily as needed for cough 30 capsule 0    naproxen (NAPROSYN) 500 MG tablet Take 1 tablet (500 mg total) by mouth 2 (two) times daily as needed (pain). 30 tablet 2    [DISCONTINUED] albuterol (PROVENTIL/VENTOLIN HFA) 90 mcg/actuation inhaler Inhale 1 puff into the lungs daily as needed for Shortness of Breath. 18 g 1    [DISCONTINUED] naproxen (NAPROSYN) 500 MG tablet Take 1 tablet (500 mg total) by mouth 2 (two) times daily. 20 tablet 0    albuterol (PROVENTIL/VENTOLIN HFA) 90 mcg/actuation inhaler Inhale 1 puff into the lungs daily as needed for Shortness of Breath. 18 g 1    naproxen (NAPROSYN) 500 MG tablet Take 1 tablet (500 mg total) by mouth 2 (two) times daily. 20 tablet 2    neomycin-polymyxin-dexamethasone (DEXACINE) 3.5 mg/g-10,000 unit/g-0.1 % Oint       predniSONE (DELTASONE) 20 MG tablet Take 1 tablet (20 mg total) by mouth once daily. (Patient not taking: Reported on 1/7/2022) 5 tablet 0     No facility-administered encounter medications on file as of 4/14/2022.       Allergies:  Review of patient's allergies indicates:  No Known Allergies    Health Maintenance:    There is no immunization history on file for this patient.   Health Maintenance   Topic Date Due    Hepatitis C Screening  Never done    TETANUS VACCINE  Never done    Abdominal Aortic Aneurysm Screening  Never done    Lipid Panel  06/03/2026        Physical Exam      Vital Signs  Temp: 97.6 °F (36.4 °C)  Pulse: 78  SpO2: 98 %  BP: 130/70  BP Location: Left leg  Patient Position: Sitting  Height and Weight  Height: 5' 3" (160 cm)  Weight: 72.6 kg (160 lb)  BSA (Calculated - sq m): 1.8 sq meters  BMI (Calculated): 28.4  Weight in (lb) to have BMI = 25: 140.8]    Physical Exam  Constitutional:       Appearance: Normal appearance.   HENT:      Head: Normocephalic.   Eyes:      " Conjunctiva/sclera: Conjunctivae normal.      Pupils: Pupils are equal, round, and reactive to light.   Cardiovascular:      Rate and Rhythm: Normal rate and regular rhythm.      Pulses: Normal pulses.   Pulmonary:      Effort: Pulmonary effort is normal.      Breath sounds: Normal breath sounds.   Abdominal:      General: Bowel sounds are normal. There is no distension.      Palpations: Abdomen is soft.   Musculoskeletal:         General: Normal range of motion.      Right lower leg: No edema.      Left lower leg: No edema.   Skin:     General: Skin is warm and dry.   Neurological:      General: No focal deficit present.      Mental Status: He is alert and oriented to person, place, and time.   Psychiatric:         Mood and Affect: Mood normal.          Laboratory:  CBC:  Recent Labs   Lab 03/11/20  0900 06/03/21  0839   WBC 4.82 5.19   RBC 3.94 L 4.12 L   Hemoglobin 13.1 L 13.3 L   Hematocrit 39.6 L 40.1   Platelet Count 134 L 143 L   .5 H 97.3 H   MCH 33.2 H 32.3 H   MCHC 33.1 33.2     CMP:  Recent Labs   Lab 03/11/20  0900 06/03/21  0839   Glucose 138 H 95   Calcium 8.5 8.7   Albumin 3.4 L  --    Total Protein 7.2  --    Sodium 139 139   Potassium 3.4 L 3.7   CO2 26 26   Chloride 105 105   BUN 7 10   Alk Phos 78  --    ALT 34  --    AST 34  --    Bilirubin, Total 0.6  --      LIPIDS:  Recent Labs   Lab 06/03/21  0839   HDL Cholesterol 38 L   Cholesterol 148   Triglycerides 70   LDL Calculated 96   Cholesterol/HDL Ratio (Risk Factor) 3.9   Non-     TSH:      A1C:        Assessment/Plan     Jeff Starr is a 71 y.o.male with:     1. Essential hypertension  - Controlled  - Continue current medications  - Basic Metabolic Panel  - CBC Auto Differential  - Lipid Panel    2. Multiple joint pain  - Continue naproxen 500mg BID PRN  - Discussed possible referral to rheumatology based on labs  - JABIER EIA w/ Reflex to dsDNA/PEPE  - Rheumatoid Quantitative  - C-Reactive Protein  - Sedimentation Rate    3.  Chronic obstructive pulmonary disease, unspecified COPD type  - Stable  - Continue PRN albuterol    Total time spent face-to-face and non-face-to-face coordinating care for this encounter was: 30 min    Chronic conditions status updated as per HPI.  Other than changes above, cont current medications and maintain follow up with specialists.  Return to clinic in 3 months.    Negrito Soliman DO  [facility]

## 2022-04-16 LAB — MAYO GENERIC ORDERABLE RESULT: NORMAL

## 2022-04-19 ENCOUNTER — TELEPHONE (OUTPATIENT)
Dept: FAMILY MEDICINE | Facility: CLINIC | Age: 72
End: 2022-04-19
Payer: COMMERCIAL

## 2022-04-19 NOTE — TELEPHONE ENCOUNTER
----- Message from Negrito Soliman DO sent at 4/18/2022 11:40 AM CDT -----  Labs negative for rheumatoid arthritis. Other labwork stable

## 2022-06-16 ENCOUNTER — IMMUNIZATION (OUTPATIENT)
Dept: FAMILY MEDICINE | Facility: CLINIC | Age: 72
End: 2022-06-16
Payer: COMMERCIAL

## 2022-06-16 DIAGNOSIS — Z23 NEED FOR VACCINATION: Primary | ICD-10-CM

## 2022-06-16 PROCEDURE — 0064A COVID-19, MRNA, LNP-S, PF, 100 MCG/0.25 ML DOSE VACCINE (MODERNA BOOSTER): CPT | Mod: ,,, | Performed by: FAMILY MEDICINE

## 2022-06-16 PROCEDURE — 91306 COVID-19, MRNA, LNP-S, PF, 100 MCG/0.25 ML DOSE VACCINE (MODERNA BOOSTER): CPT | Mod: ,,, | Performed by: FAMILY MEDICINE

## 2022-06-16 PROCEDURE — 0064A COVID-19, MRNA, LNP-S, PF, 100 MCG/0.25 ML DOSE VACCINE (MODERNA BOOSTER): ICD-10-PCS | Mod: ,,, | Performed by: FAMILY MEDICINE

## 2022-06-16 PROCEDURE — 91306 COVID-19, MRNA, LNP-S, PF, 100 MCG/0.25 ML DOSE VACCINE (MODERNA BOOSTER): ICD-10-PCS | Mod: ,,, | Performed by: FAMILY MEDICINE

## 2022-07-14 ENCOUNTER — OFFICE VISIT (OUTPATIENT)
Dept: FAMILY MEDICINE | Facility: CLINIC | Age: 72
End: 2022-07-14
Payer: COMMERCIAL

## 2022-07-14 VITALS
HEART RATE: 59 BPM | DIASTOLIC BLOOD PRESSURE: 72 MMHG | BODY MASS INDEX: 27.11 KG/M2 | TEMPERATURE: 99 F | OXYGEN SATURATION: 97 % | HEIGHT: 63 IN | SYSTOLIC BLOOD PRESSURE: 132 MMHG | WEIGHT: 153 LBS

## 2022-07-14 DIAGNOSIS — G89.29 CHRONIC ELBOW PAIN, LEFT: Primary | ICD-10-CM

## 2022-07-14 DIAGNOSIS — M25.522 CHRONIC ELBOW PAIN, LEFT: Primary | ICD-10-CM

## 2022-07-14 DIAGNOSIS — I10 ESSENTIAL HYPERTENSION: ICD-10-CM

## 2022-07-14 DIAGNOSIS — G56.22 CUBITAL TUNNEL SYNDROME ON LEFT: ICD-10-CM

## 2022-07-14 PROCEDURE — 99214 OFFICE O/P EST MOD 30 MIN: CPT | Mod: ,,, | Performed by: FAMILY MEDICINE

## 2022-07-14 PROCEDURE — 1101F PR PT FALLS ASSESS DOC 0-1 FALLS W/OUT INJ PAST YR: ICD-10-PCS | Mod: ,,, | Performed by: FAMILY MEDICINE

## 2022-07-14 PROCEDURE — 3075F SYST BP GE 130 - 139MM HG: CPT | Mod: ,,, | Performed by: FAMILY MEDICINE

## 2022-07-14 PROCEDURE — 3008F BODY MASS INDEX DOCD: CPT | Mod: ,,, | Performed by: FAMILY MEDICINE

## 2022-07-14 PROCEDURE — 3288F PR FALLS RISK ASSESSMENT DOCUMENTED: ICD-10-PCS | Mod: ,,, | Performed by: FAMILY MEDICINE

## 2022-07-14 PROCEDURE — 3078F PR MOST RECENT DIASTOLIC BLOOD PRESSURE < 80 MM HG: ICD-10-PCS | Mod: ,,, | Performed by: FAMILY MEDICINE

## 2022-07-14 PROCEDURE — 3288F FALL RISK ASSESSMENT DOCD: CPT | Mod: ,,, | Performed by: FAMILY MEDICINE

## 2022-07-14 PROCEDURE — 1101F PT FALLS ASSESS-DOCD LE1/YR: CPT | Mod: ,,, | Performed by: FAMILY MEDICINE

## 2022-07-14 PROCEDURE — 99214 PR OFFICE/OUTPT VISIT, EST, LEVL IV, 30-39 MIN: ICD-10-PCS | Mod: ,,, | Performed by: FAMILY MEDICINE

## 2022-07-14 PROCEDURE — 3075F PR MOST RECENT SYSTOLIC BLOOD PRESS GE 130-139MM HG: ICD-10-PCS | Mod: ,,, | Performed by: FAMILY MEDICINE

## 2022-07-14 PROCEDURE — 1159F PR MEDICATION LIST DOCUMENTED IN MEDICAL RECORD: ICD-10-PCS | Mod: ,,, | Performed by: FAMILY MEDICINE

## 2022-07-14 PROCEDURE — 1159F MED LIST DOCD IN RCRD: CPT | Mod: ,,, | Performed by: FAMILY MEDICINE

## 2022-07-14 PROCEDURE — 3008F PR BODY MASS INDEX (BMI) DOCUMENTED: ICD-10-PCS | Mod: ,,, | Performed by: FAMILY MEDICINE

## 2022-07-14 PROCEDURE — 3078F DIAST BP <80 MM HG: CPT | Mod: ,,, | Performed by: FAMILY MEDICINE

## 2022-07-14 RX ORDER — GABAPENTIN 300 MG/1
300 CAPSULE ORAL NIGHTLY
Qty: 90 CAPSULE | Refills: 0 | Status: SHIPPED | OUTPATIENT
Start: 2022-07-14 | End: 2023-03-21

## 2022-07-14 RX ORDER — AMLODIPINE BESYLATE 10 MG/1
10 TABLET ORAL DAILY
Qty: 90 TABLET | Refills: 0 | Status: SHIPPED | OUTPATIENT
Start: 2022-07-14 | End: 2022-10-18 | Stop reason: SDUPTHER

## 2022-07-14 NOTE — PROGRESS NOTES
[facility] Medicine    Chief Complaint      Chief Complaint   Patient presents with    Follow-up     3m follow up       History of Present Illness      Jeff Starr is a 72 y.o. male with chronic conditions of HTN, COPD, and osteoarthritis who presents today for follow up. Pt states he is still having left elbow pain and numbness of 4th and 5th digit of left hand. No significant improvement with naproxen. 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 Main mostly on medial aspect of left elbow. States other chronic conditions stable.    Past Medical History:  Past Medical History:   Diagnosis Date    HTN (hypertension)        Past Surgical History:   has no past surgical history on file.    Social History:  Social History     Tobacco Use    Smoking status: Current Every Day Smoker     Packs/day: 1.00     Types: Cigarettes    Smokeless tobacco: Never Used   Substance Use Topics    Alcohol use: Not Currently    Drug use: Never       I personally reviewed all past medical, surgical, and social.     Review of Systems   Constitutional: Negative for fatigue and fever.   HENT: Negative for ear pain.    Eyes: Negative for pain and visual disturbance.   Respiratory: Negative for chest tightness and shortness of breath.    Cardiovascular: Negative for chest pain and leg swelling.   Gastrointestinal: Negative for abdominal pain.   Genitourinary: Negative for difficulty urinating.   Musculoskeletal: Positive for arthralgias. Negative for gait problem and myalgias.   Skin: Negative for rash.   Neurological: Positive for numbness. Negative for dizziness and light-headedness.   Hematological: Does not bruise/bleed easily.        Medications:  Outpatient Encounter Medications as of 7/14/2022   Medication Sig Dispense Refill    acetaminophen-codeine 300-30mg (TYLENOL #3) 300-30 mg Tab Take 1 tablet by mouth every 6 (six) hours as needed.      albuterol (PROVENTIL/VENTOLIN HFA) 90 mcg/actuation  "inhaler Inhale 1 puff into the lungs daily as needed for Shortness of Breath. 18 g 1    amLODIPine (NORVASC) 10 MG tablet Take 1 tablet (10 mg total) by mouth once daily. 90 tablet 0    benzonatate (TESSALON) 100 MG capsule One to two capsules three times daily as needed for cough 30 capsule 0    gabapentin (NEURONTIN) 300 MG capsule Take 1 capsule (300 mg total) by mouth every evening. 90 capsule 0    naproxen (NAPROSYN) 500 MG tablet Take 1 tablet (500 mg total) by mouth 2 (two) times daily as needed (pain). 30 tablet 2    naproxen (NAPROSYN) 500 MG tablet Take 1 tablet (500 mg total) by mouth 2 (two) times daily. 20 tablet 2    neomycin-polymyxin-dexamethasone (DEXACINE) 3.5 mg/g-10,000 unit/g-0.1 % Oint       predniSONE (DELTASONE) 20 MG tablet Take 1 tablet (20 mg total) by mouth once daily. (Patient not taking: Reported on 1/7/2022) 5 tablet 0    [DISCONTINUED] amLODIPine (NORVASC) 10 MG tablet Take 1 tablet (10 mg total) by mouth once daily. 90 tablet 0     No facility-administered encounter medications on file as of 7/14/2022.       Allergies:  Review of patient's allergies indicates:  No Known Allergies    Health Maintenance:  Immunization History   Administered Date(s) Administered    COVID-19 MRNA, LN-S PF (MODERNA HALF 0.25 ML DOSE) 06/16/2022      Health Maintenance   Topic Date Due    Hepatitis C Screening  Never done    TETANUS VACCINE  Never done    Abdominal Aortic Aneurysm Screening  Never done    Lipid Panel  04/14/2027        Physical Exam      Vital Signs  Temp: 98.6 °F (37 °C)  Temp src: Oral  Pulse: (!) 59  SpO2: 97 %  BP: 132/72  BP Location: Left arm  Patient Position: Sitting  Height and Weight  Height: 5' 3" (160 cm)  Weight: 69.4 kg (153 lb)  BSA (Calculated - sq m): 1.76 sq meters  BMI (Calculated): 27.1  Weight in (lb) to have BMI = 25: 140.8]    Physical Exam  Constitutional:       Appearance: Normal appearance.   HENT:      Head: Normocephalic.   Eyes:      " Conjunctiva/sclera: Conjunctivae normal.      Pupils: Pupils are equal, round, and reactive to light.   Cardiovascular:      Rate and Rhythm: Normal rate and regular rhythm.      Pulses: Normal pulses.   Pulmonary:      Effort: Pulmonary effort is normal.      Breath sounds: Normal breath sounds.   Abdominal:      General: Bowel sounds are normal. There is no distension.      Palpations: Abdomen is soft.   Musculoskeletal:         General: Normal range of motion.      Left elbow: Tenderness present in medial epicondyle.      Right lower leg: No edema.      Left lower leg: No edema.      Comments: +Tinnel's at left elbow   Skin:     General: Skin is warm and dry.   Neurological:      General: No focal deficit present.      Mental Status: He is alert and oriented to person, place, and time.   Psychiatric:         Mood and Affect: Mood normal.          Laboratory:  CBC:  Recent Labs   Lab 03/11/20  0900 06/03/21  0839 04/14/22  0854   WBC 4.82 5.19 5.39   RBC 3.94 L 4.12 L 4.01 L   Hemoglobin 13.1 L 13.3 L 13.1 L   Hematocrit 39.6 L 40.1 40.2   Platelet Count 134 L 143 L 144 L   .5 H 97.3 H 100.2 H   MCH 33.2 H 32.3 H 32.7 H   MCHC 33.1 33.2 32.6     CMP:  Recent Labs   Lab 03/11/20  0900 06/03/21  0839 04/14/22  0854   Glucose 138 H   < > 95   Calcium 8.5   < > 8.5   Albumin 3.4 L  --   --    Total Protein 7.2  --   --    Sodium 139   < > 140   Potassium 3.4 L   < > 4.2   CO2 26   < > 26   Chloride 105   < > 111 H   BUN 7   < > 13   Alk Phos 78  --   --    ALT 34  --   --    AST 34  --   --    Bilirubin, Total 0.6  --   --     < > = values in this interval not displayed.     LIPIDS:  Recent Labs   Lab 06/03/21  0839 04/14/22  0854   HDL Cholesterol 38 L 38 L   Cholesterol 148 151   Triglycerides 70 79   LDL Calculated 96 97   Cholesterol/HDL Ratio (Risk Factor) 3.9 4.0   Non- 113     TSH:      A1C:        Assessment/Plan     Jeff Starr is a 72 y.o.male with:     1. Chronic elbow pain, left  -  Ambulatory referral/consult to Pain Clinic; Future    2. Cubital tunnel syndrome on left  - Discussed referral to orthopedics for evaluation, but pt adamant about not undergoing surgery; states he is open to referral to Pain Treatment  - Trial of gabapentin 300mg qHS    3. Essential hypertension  - Blood pressure at goal  - Continue current medications    Total time spent face-to-face and non-face-to-face coordinating care for this encounter was: 30 min    Chronic conditions status updated as per HPI.  Other than changes above, cont current medications and maintain follow up with specialists.  Return to clinic in 3 months.    Negrito Soliman DO  [facility]

## 2022-10-18 ENCOUNTER — OFFICE VISIT (OUTPATIENT)
Dept: FAMILY MEDICINE | Facility: CLINIC | Age: 72
End: 2022-10-18
Payer: COMMERCIAL

## 2022-10-18 VITALS
BODY MASS INDEX: 26.58 KG/M2 | DIASTOLIC BLOOD PRESSURE: 76 MMHG | SYSTOLIC BLOOD PRESSURE: 135 MMHG | WEIGHT: 150 LBS | OXYGEN SATURATION: 99 % | TEMPERATURE: 98 F | HEART RATE: 67 BPM | HEIGHT: 63 IN

## 2022-10-18 DIAGNOSIS — Z12.5 PROSTATE CANCER SCREENING: ICD-10-CM

## 2022-10-18 DIAGNOSIS — F17.200 TOBACCO DEPENDENCE: ICD-10-CM

## 2022-10-18 DIAGNOSIS — M15.9 OSTEOARTHRITIS OF MULTIPLE JOINTS, UNSPECIFIED OSTEOARTHRITIS TYPE: ICD-10-CM

## 2022-10-18 DIAGNOSIS — J44.9 CHRONIC OBSTRUCTIVE PULMONARY DISEASE, UNSPECIFIED COPD TYPE: ICD-10-CM

## 2022-10-18 DIAGNOSIS — Z12.11 COLON CANCER SCREENING: ICD-10-CM

## 2022-10-18 DIAGNOSIS — I10 HYPERTENSION, UNSPECIFIED TYPE: Primary | ICD-10-CM

## 2022-10-18 LAB
ALBUMIN SERPL BCP-MCNC: 3.8 G/DL (ref 3.5–5)
ANION GAP SERPL CALCULATED.3IONS-SCNC: 6 MMOL/L (ref 7–16)
BUN SERPL-MCNC: 12 MG/DL (ref 7–18)
BUN/CREAT SERPL: 13 (ref 6–20)
CALCIUM SERPL-MCNC: 8.5 MG/DL (ref 8.5–10.1)
CHLORIDE SERPL-SCNC: 110 MMOL/L (ref 98–107)
CO2 SERPL-SCNC: 28 MMOL/L (ref 21–32)
CREAT SERPL-MCNC: 0.95 MG/DL (ref 0.7–1.3)
CREAT UR-MCNC: 76 MG/DL (ref 39–259)
EGFR (NO RACE VARIABLE) (RUSH/TITUS): 86 ML/MIN/1.73M²
GLUCOSE SERPL-MCNC: 83 MG/DL (ref 74–106)
MICROALBUMIN UR-MCNC: 1.7 MG/DL (ref 0–2.8)
MICROALBUMIN/CREAT RATIO PNL UR: 22.4 MG/G (ref 0–30)
PHOSPHATE SERPL-MCNC: 3 MG/DL (ref 2.5–4.5)
POTASSIUM SERPL-SCNC: 4.2 MMOL/L (ref 3.5–5.1)
PSA SERPL-MCNC: 1.08 NG/ML
SODIUM SERPL-SCNC: 140 MMOL/L (ref 136–145)

## 2022-10-18 PROCEDURE — 99214 PR OFFICE/OUTPT VISIT, EST, LEVL IV, 30-39 MIN: ICD-10-PCS | Mod: ,,, | Performed by: NURSE PRACTITIONER

## 2022-10-18 PROCEDURE — 82043 MICROALBUMIN / CREATININE RATIO URINE: ICD-10-PCS | Mod: ,,, | Performed by: CLINICAL MEDICAL LABORATORY

## 2022-10-18 PROCEDURE — 82570 ASSAY OF URINE CREATININE: CPT | Mod: ,,, | Performed by: CLINICAL MEDICAL LABORATORY

## 2022-10-18 PROCEDURE — 82570 MICROALBUMIN / CREATININE RATIO URINE: ICD-10-PCS | Mod: ,,, | Performed by: CLINICAL MEDICAL LABORATORY

## 2022-10-18 PROCEDURE — 1159F MED LIST DOCD IN RCRD: CPT | Mod: ,,, | Performed by: NURSE PRACTITIONER

## 2022-10-18 PROCEDURE — G0103 PSA SCREENING: HCPCS | Mod: ,,, | Performed by: CLINICAL MEDICAL LABORATORY

## 2022-10-18 PROCEDURE — G0103 PSA, SCREENING: ICD-10-PCS | Mod: ,,, | Performed by: CLINICAL MEDICAL LABORATORY

## 2022-10-18 PROCEDURE — 82043 UR ALBUMIN QUANTITATIVE: CPT | Mod: ,,, | Performed by: CLINICAL MEDICAL LABORATORY

## 2022-10-18 PROCEDURE — 99214 OFFICE O/P EST MOD 30 MIN: CPT | Mod: ,,, | Performed by: NURSE PRACTITIONER

## 2022-10-18 PROCEDURE — 3075F PR MOST RECENT SYSTOLIC BLOOD PRESS GE 130-139MM HG: ICD-10-PCS | Mod: ,,, | Performed by: NURSE PRACTITIONER

## 2022-10-18 PROCEDURE — 3075F SYST BP GE 130 - 139MM HG: CPT | Mod: ,,, | Performed by: NURSE PRACTITIONER

## 2022-10-18 PROCEDURE — 1160F RVW MEDS BY RX/DR IN RCRD: CPT | Mod: ,,, | Performed by: NURSE PRACTITIONER

## 2022-10-18 PROCEDURE — 3078F DIAST BP <80 MM HG: CPT | Mod: ,,, | Performed by: NURSE PRACTITIONER

## 2022-10-18 PROCEDURE — 80069 RENAL FUNCTION PANEL: ICD-10-PCS | Mod: ,,, | Performed by: CLINICAL MEDICAL LABORATORY

## 2022-10-18 PROCEDURE — 3078F PR MOST RECENT DIASTOLIC BLOOD PRESSURE < 80 MM HG: ICD-10-PCS | Mod: ,,, | Performed by: NURSE PRACTITIONER

## 2022-10-18 PROCEDURE — 1160F PR REVIEW ALL MEDS BY PRESCRIBER/CLIN PHARMACIST DOCUMENTED: ICD-10-PCS | Mod: ,,, | Performed by: NURSE PRACTITIONER

## 2022-10-18 PROCEDURE — 1159F PR MEDICATION LIST DOCUMENTED IN MEDICAL RECORD: ICD-10-PCS | Mod: ,,, | Performed by: NURSE PRACTITIONER

## 2022-10-18 PROCEDURE — 80069 RENAL FUNCTION PANEL: CPT | Mod: ,,, | Performed by: CLINICAL MEDICAL LABORATORY

## 2022-10-18 RX ORDER — AMLODIPINE BESYLATE 10 MG/1
10 TABLET ORAL DAILY
Qty: 90 TABLET | Refills: 1 | Status: SHIPPED | OUTPATIENT
Start: 2022-10-18 | End: 2023-06-22 | Stop reason: SDUPTHER

## 2022-10-18 RX ORDER — NAPROXEN 500 MG/1
500 TABLET ORAL 2 TIMES DAILY PRN
Qty: 30 TABLET | Refills: 2 | Status: SHIPPED | OUTPATIENT
Start: 2022-10-18 | End: 2023-03-21 | Stop reason: SDUPTHER

## 2022-10-18 RX ORDER — ALBUTEROL SULFATE 90 UG/1
1 AEROSOL, METERED RESPIRATORY (INHALATION) EVERY 4 HOURS PRN
Qty: 18 G | Refills: 3 | Status: SHIPPED | OUTPATIENT
Start: 2022-10-18 | End: 2023-06-22 | Stop reason: SDUPTHER

## 2022-10-18 NOTE — PROGRESS NOTES
[facility] Medicine          Chief Complaint        Chief Complaint   Patient presents with    Follow-up     3 Month             History of Present Illness           Jeff Starr is a 72 y.o. male with chronic conditions of HTN, OA, and COPD who presents today for routine follow up.  The pt is doing well within no current complaints.  He is a smoker for about 55 years.  He states he had a colonoscopy many years ago but does not know exactly who he saw or what they found but he does know it was not cancer.            Past Medical History:     Past Medical History:   Diagnosis Date    HTN (hypertension)              Past Surgical History:      has no past surgical history on file.          Social History:     Social History     Tobacco Use    Smoking status: Every Day     Packs/day: 1.00     Types: Cigarettes    Smokeless tobacco: Never   Substance Use Topics    Alcohol use: Not Currently    Drug use: Never             I personally reviewed all past medical, surgical, and social.           Review of Systems   Constitutional: Negative.    HENT: Negative.     Eyes: Negative.    Respiratory: Negative.     Cardiovascular: Negative.    Gastrointestinal: Negative.    Endocrine: Negative.    Genitourinary: Negative.    Musculoskeletal:  Positive for arthralgias and myalgias.   Allergic/Immunologic: Negative.    Hematological: Negative.    Psychiatric/Behavioral: Negative.              Medications:     Outpatient Encounter Medications as of 10/18/2022   Medication Sig Dispense Refill    albuterol (PROVENTIL/VENTOLIN HFA) 90 mcg/actuation inhaler Inhale 1 puff into the lungs every 4 (four) hours as needed for Shortness of Breath. 18 g 3    amLODIPine (NORVASC) 10 MG tablet Take 1 tablet (10 mg total) by mouth once daily. 90 tablet 1    benzonatate (TESSALON) 100 MG capsule One to two capsules three times daily as needed for cough 30 capsule 0    gabapentin (NEURONTIN) 300 MG capsule Take 1 capsule (300 mg total) by mouth  "every evening. 90 capsule 0    naproxen (NAPROSYN) 500 MG tablet Take 1 tablet (500 mg total) by mouth 2 (two) times daily. 20 tablet 2    naproxen (NAPROSYN) 500 MG tablet Take 1 tablet (500 mg total) by mouth 2 (two) times daily as needed (pain). 30 tablet 2    neomycin-polymyxin-dexamethasone (DEXACINE) 3.5 mg/g-10,000 unit/g-0.1 % Oint       [DISCONTINUED] acetaminophen-codeine 300-30mg (TYLENOL #3) 300-30 mg Tab Take 1 tablet by mouth every 6 (six) hours as needed.      [DISCONTINUED] albuterol (PROVENTIL/VENTOLIN HFA) 90 mcg/actuation inhaler Inhale 1 puff into the lungs daily as needed for Shortness of Breath. 18 g 1    [DISCONTINUED] amLODIPine (NORVASC) 10 MG tablet Take 1 tablet (10 mg total) by mouth once daily. 90 tablet 0    [DISCONTINUED] naproxen (NAPROSYN) 500 MG tablet Take 1 tablet (500 mg total) by mouth 2 (two) times daily as needed (pain). 30 tablet 2    [DISCONTINUED] predniSONE (DELTASONE) 20 MG tablet Take 1 tablet (20 mg total) by mouth once daily. (Patient not taking: Reported on 1/7/2022) 5 tablet 0     No facility-administered encounter medications on file as of 10/18/2022.             Allergies:     Review of patient's allergies indicates:  No Known Allergies          Health Maintenance:     Immunization History   Administered Date(s) Administered    COVID-19 MRNA, LN-S PF (MODERNA HALF 0.25 ML DOSE) 06/16/2022         Health Maintenance   Topic Date Due    Hepatitis C Screening  Never done    TETANUS VACCINE  Never done    Abdominal Aortic Aneurysm Screening  Never done    Lipid Panel  04/14/2027              Physical Exam           Vital Signs  Temp: 97.5 °F (36.4 °C)  Temp src: Oral  Pulse: 67  SpO2: 99 %  BP: 135/76  BP Location: Left arm  Patient Position: Sitting  Height and Weight  Height: 5' 3" (160 cm)  Weight: 68 kg (150 lb)  BSA (Calculated - sq m): 1.74 sq meters  BMI (Calculated): 26.6  Weight in (lb) to have BMI = 25: 140.8]          Physical Exam  Vitals and nursing note " reviewed.   Constitutional:       General: He is not in acute distress.     Appearance: Normal appearance. He is not ill-appearing.   HENT:      Head: Normocephalic.      Right Ear: External ear normal.      Left Ear: External ear normal.      Mouth/Throat:      Mouth: Mucous membranes are moist.   Eyes:      Conjunctiva/sclera: Conjunctivae normal.   Cardiovascular:      Rate and Rhythm: Normal rate and regular rhythm.      Pulses: Normal pulses.      Heart sounds: Normal heart sounds. No murmur heard.    No friction rub. No gallop.   Pulmonary:      Effort: Pulmonary effort is normal. No respiratory distress.      Breath sounds: Normal breath sounds. No stridor. No wheezing, rhonchi or rales.   Chest:      Chest wall: No tenderness.   Abdominal:      General: Abdomen is flat. There is no distension.   Musculoskeletal:         General: No swelling or tenderness. Normal range of motion.      Cervical back: Neck supple.      Right lower leg: No edema.      Left lower leg: No edema.   Skin:     General: Skin is warm and dry.      Coloration: Skin is not jaundiced or pale.      Findings: No erythema or rash.   Neurological:      General: No focal deficit present.      Mental Status: He is alert and oriented to person, place, and time. Mental status is at baseline.      Motor: No weakness.      Gait: Gait normal.   Psychiatric:         Mood and Affect: Mood normal.         Behavior: Behavior normal.         Thought Content: Thought content normal.         Judgment: Judgment normal.              Laboratory:     CBC:     Recent Labs   Lab 03/11/20  0900 06/03/21  0839 04/14/22  0854   WBC 4.82 5.19 5.39   RBC 3.94 L 4.12 L 4.01 L   Hemoglobin 13.1 L 13.3 L 13.1 L   Hematocrit 39.6 L 40.1 40.2   Platelet Count 134 L 143 L 144 L   .5 H 97.3 H 100.2 H   MCH 33.2 H 32.3 H 32.7 H   MCHC 33.1 33.2 32.6        CMP:     Recent Labs   Lab 03/11/20  0900 06/03/21  0839 04/14/22  0854   Glucose 138 H   < > 95   Calcium 8.5    < > 8.5   Albumin 3.4 L  --   --    Total Protein 7.2  --   --    Sodium 139   < > 140   Potassium 3.4 L   < > 4.2   CO2 26   < > 26   Chloride 105   < > 111 H   BUN 7   < > 13   Alk Phos 78  --   --    ALT 34  --   --    AST 34  --   --    Bilirubin, Total 0.6  --   --     < > = values in this interval not displayed.        LIPIDS:     Recent Labs   Lab 06/03/21  0839 04/14/22  0854   HDL Cholesterol 38 L 38 L   Cholesterol 148 151   Triglycerides 70 79   LDL Calculated 96 97   Cholesterol/HDL Ratio (Risk Factor) 3.9 4.0   Non- 113        TSH:            A1C:                 Assessment/Plan          Jeff Starr is a 72 y.o.male with:           1. Hypertension, unspecified type  - Renal Function Panel; Future  - Microalbumin/Creatinine Ratio, Urine; Future  - Renal Function Panel  - Microalbumin/Creatinine Ratio, Urine  - amLODIPine (NORVASC) 10 MG tablet; Take 1 tablet (10 mg total) by mouth once daily.  Dispense: 90 tablet; Refill: 1    2. Chronic obstructive pulmonary disease, unspecified COPD type  - albuterol (PROVENTIL/VENTOLIN HFA) 90 mcg/actuation inhaler; Inhale 1 puff into the lungs every 4 (four) hours as needed for Shortness of Breath.  Dispense: 18 g; Refill: 3    3. Tobacco dependence  - CT Chest Lung Screening Low Dose; Future    4. Osteoarthritis of multiple joints, unspecified osteoarthritis type  - naproxen (NAPROSYN) 500 MG tablet; Take 1 tablet (500 mg total) by mouth 2 (two) times daily as needed (pain).  Dispense: 30 tablet; Refill: 2    5. Prostate cancer screening  - PSA, Screening; Future  - PSA, Screening    6. Colon cancer screening  - Ambulatory referral/consult to Gastroenterology; Future      -cont current med regimen; check labs today       Total time spent face-to-face and non-face-to-face coordinating care for this encounter was: 25 min          Chronic conditions status updated as per HPI.  Other than changes above, cont current medications and maintain follow up with  specialists.  Return to clinic in three months.          SKY Porras-C     [facility]

## 2022-10-19 ENCOUNTER — TELEPHONE (OUTPATIENT)
Dept: FAMILY MEDICINE | Facility: CLINIC | Age: 72
End: 2022-10-19
Payer: COMMERCIAL

## 2022-10-19 NOTE — TELEPHONE ENCOUNTER
----- Message from Cain Santoyo NP sent at 10/18/2022  6:42 PM CDT -----  Let pt know his labs are within normal limits

## 2022-11-01 NOTE — PROGRESS NOTES
[facility] Medicine          Chief Complaint        Chief Complaint   Patient presents with    Medicare AWV      Initial [employer] AW              History of Present Illness           Jeff Starr is a 72 y.o. male with chronic conditions of HTN who presents today for coughing.  The pt's s/s started about 2-3 days ago.  The pt denies chest pains, sob, n/v, and high fever.          Past Medical History:     Past Medical History:   Diagnosis Date    HTN (hypertension)              Past Surgical History:      has no past surgical history on file.          Social History:     Social History     Tobacco Use    Smoking status: Every Day     Packs/day: 1.00     Types: Cigarettes    Smokeless tobacco: Never   Substance Use Topics    Alcohol use: Not Currently    Drug use: Never             I personally reviewed all past medical, surgical, and social.           Review of Systems   Constitutional:  Positive for fatigue.   HENT:  Positive for congestion.    Eyes: Negative.    Respiratory:  Positive for cough.    Cardiovascular: Negative.    Gastrointestinal: Negative.    Endocrine: Negative.    Genitourinary: Negative.    Musculoskeletal: Negative.    Allergic/Immunologic: Negative.    Hematological: Negative.    Psychiatric/Behavioral: Negative.              Medications:     Outpatient Encounter Medications as of 11/2/2022   Medication Sig Dispense Refill    albuterol (PROVENTIL/VENTOLIN HFA) 90 mcg/actuation inhaler Inhale 1 puff into the lungs every 4 (four) hours as needed for Shortness of Breath. 18 g 3    amLODIPine (NORVASC) 10 MG tablet Take 1 tablet (10 mg total) by mouth once daily. 90 tablet 1    azithromycin (Z-ALF) 250 MG tablet TAKE TWO TABS PO ON DAY 1, THEN TAKE ONE TAB A DAY FOR 4 DAYS 6 tablet 0    benzonatate (TESSALON) 100 MG capsule One to two capsules three times daily as needed for cough 30 capsule 0    dexAMETHasone (DECADRON) 4 MG Tab Take 1 tablet (4 mg total) by mouth every 12 (twelve)  "hours. for 5 days 10 tablet 0    gabapentin (NEURONTIN) 300 MG capsule Take 1 capsule (300 mg total) by mouth every evening. 90 capsule 0    naproxen (NAPROSYN) 500 MG tablet Take 1 tablet (500 mg total) by mouth 2 (two) times daily. 20 tablet 2    naproxen (NAPROSYN) 500 MG tablet Take 1 tablet (500 mg total) by mouth 2 (two) times daily as needed (pain). 30 tablet 2    neomycin-polymyxin-dexamethasone (DEXACINE) 3.5 mg/g-10,000 unit/g-0.1 % Oint       promethazine-dextromethorphan (PROMETHAZINE-DM) 6.25-15 mg/5 mL Syrp Take 5 mLs by mouth every 4 (four) hours as needed (cough). 240 mL 0     No facility-administered encounter medications on file as of 11/2/2022.             Allergies:     Review of patient's allergies indicates:  No Known Allergies          Health Maintenance:     Immunization History   Administered Date(s) Administered    COVID-19 MRNA, LN-S PF (MODERNA HALF 0.25 ML DOSE) 06/16/2022         Health Maintenance   Topic Date Due    Hepatitis C Screening  Never done    TETANUS VACCINE  Never done    Abdominal Aortic Aneurysm Screening  Never done    Lipid Panel  04/14/2027              Physical Exam           Vital Signs  Temp: 99.4 °F (37.4 °C)  Temp src: Oral  Pulse: 77  Resp: 14  SpO2: 98 % (room air)  BP: (!) 140/80  BP Location: Left arm  Patient Position: Sitting  Pain Score:   3 (states feeling ache but has hx arthitis cough up clear whitish color phleegm and feelin sluggish states "I think Im coming done with something" awv rescheduled flu/covid swab done tolerated well)  Pain Loc: Shoulder (left shoulder discomfort)  Height and Weight  Height: 5' 3" (160 cm)  Weight: 68 kg (150 lb)  BSA (Calculated - sq m): 1.74 sq meters  BMI (Calculated): 26.6  Weight in (lb) to have BMI = 25: 140.8]          Physical Exam  Vitals and nursing note reviewed.   Constitutional:       General: He is not in acute distress.     Appearance: Normal appearance. He is not ill-appearing.   HENT:      Head: " Normocephalic.      Right Ear: External ear normal.      Left Ear: External ear normal.      Nose: Congestion and rhinorrhea present.      Mouth/Throat:      Mouth: Mucous membranes are moist.   Eyes:      Conjunctiva/sclera: Conjunctivae normal.   Cardiovascular:      Rate and Rhythm: Normal rate and regular rhythm.      Pulses: Normal pulses.      Heart sounds: Normal heart sounds. No murmur heard.    No friction rub. No gallop.   Pulmonary:      Effort: Pulmonary effort is normal. No respiratory distress.      Breath sounds: Normal breath sounds. No stridor. No wheezing, rhonchi or rales.   Chest:      Chest wall: No tenderness.   Abdominal:      General: Abdomen is flat. There is no distension.   Musculoskeletal:         General: No swelling or tenderness. Normal range of motion.      Cervical back: Neck supple.      Right lower leg: No edema.      Left lower leg: No edema.   Skin:     General: Skin is warm and dry.      Coloration: Skin is not jaundiced or pale.      Findings: No erythema or rash.   Neurological:      General: No focal deficit present.      Mental Status: He is alert and oriented to person, place, and time. Mental status is at baseline.      Motor: No weakness.      Gait: Gait normal.   Psychiatric:         Mood and Affect: Mood normal.         Behavior: Behavior normal.         Thought Content: Thought content normal.         Judgment: Judgment normal.              Laboratory:     CBC:     Recent Labs   Lab 03/11/20  0900 06/03/21  0839 04/14/22  0854   WBC 4.82 5.19 5.39   RBC 3.94 L 4.12 L 4.01 L   Hemoglobin 13.1 L 13.3 L 13.1 L   Hematocrit 39.6 L 40.1 40.2   Platelet Count 134 L 143 L 144 L   .5 H 97.3 H 100.2 H   MCH 33.2 H 32.3 H 32.7 H   MCHC 33.1 33.2 32.6        CMP:     Recent Labs   Lab 03/11/20  0900 06/03/21  0839 10/18/22  1020   Glucose 138 H   < > 83   Calcium 8.5   < > 8.5   Albumin 3.4 L  --  3.8   Total Protein 7.2  --   --    Sodium 139   < > 140   Potassium 3.4 L    < > 4.2   CO2 26   < > 28   Chloride 105   < > 110 H   BUN 7   < > 12   Alk Phos 78  --   --    ALT 34  --   --    AST 34  --   --    Bilirubin, Total 0.6  --   --     < > = values in this interval not displayed.        LIPIDS:     Recent Labs   Lab 06/03/21  0839 04/14/22  0854   HDL Cholesterol 38 L 38 L   Cholesterol 148 151   Triglycerides 70 79   LDL Calculated 96 97   Cholesterol/HDL Ratio (Risk Factor) 3.9 4.0   Non- 113        TSH:            A1C:                 Assessment/Plan          Jeff Starr is a 72 y.o.male with:           1. COVID  - promethazine-dextromethorphan (PROMETHAZINE-DM) 6.25-15 mg/5 mL Syrp; Take 5 mLs by mouth every 4 (four) hours as needed (cough).  Dispense: 240 mL; Refill: 0  - azithromycin (Z-ALF) 250 MG tablet; TAKE TWO TABS PO ON DAY 1, THEN TAKE ONE TAB A DAY FOR 4 DAYS  Dispense: 6 tablet; Refill: 0  - dexAMETHasone (DECADRON) 4 MG Tab; Take 1 tablet (4 mg total) by mouth every 12 (twelve) hours. for 5 days  Dispense: 10 tablet; Refill: 0    2. Contact with and (suspected) exposure to covid-19  - POCT SARS-COV2 (COVID) with Flu Antigen    3. Cough, unspecified type      -discussed covid isolation guidelines; monitor       Total time spent face-to-face and non-face-to-face coordinating care for this encounter was: 25 min          Chronic conditions status updated as per HPI.  Other than changes above, cont current medications and maintain follow up with specialists.  Return to clinic PRN.          KACI Porras     [facility]

## 2022-11-02 ENCOUNTER — OFFICE VISIT (OUTPATIENT)
Dept: FAMILY MEDICINE | Facility: CLINIC | Age: 72
End: 2022-11-02
Payer: COMMERCIAL

## 2022-11-02 VITALS
WEIGHT: 150 LBS | HEART RATE: 77 BPM | DIASTOLIC BLOOD PRESSURE: 80 MMHG | OXYGEN SATURATION: 98 % | BODY MASS INDEX: 26.58 KG/M2 | HEIGHT: 63 IN | TEMPERATURE: 99 F | SYSTOLIC BLOOD PRESSURE: 140 MMHG | RESPIRATION RATE: 14 BRPM

## 2022-11-02 DIAGNOSIS — U07.1 COVID: Primary | ICD-10-CM

## 2022-11-02 DIAGNOSIS — R05.9 COUGH, UNSPECIFIED TYPE: ICD-10-CM

## 2022-11-02 DIAGNOSIS — Z20.822 CONTACT WITH AND (SUSPECTED) EXPOSURE TO COVID-19: ICD-10-CM

## 2022-11-02 LAB
CTP QC/QA: YES
FLUAV AG NPH QL: NEGATIVE
FLUBV AG NPH QL: NEGATIVE
SARS-COV-2 AG RESP QL IA.RAPID: POSITIVE

## 2022-11-02 PROCEDURE — 3066F NEPHROPATHY DOC TX: CPT | Mod: ,,, | Performed by: NURSE PRACTITIONER

## 2022-11-02 PROCEDURE — 3079F PR MOST RECENT DIASTOLIC BLOOD PRESSURE 80-89 MM HG: ICD-10-PCS | Mod: ,,, | Performed by: NURSE PRACTITIONER

## 2022-11-02 PROCEDURE — 3061F PR NEG MICROALBUMINURIA RESULT DOCUMENTED/REVIEW: ICD-10-PCS | Mod: ,,, | Performed by: NURSE PRACTITIONER

## 2022-11-02 PROCEDURE — 3079F DIAST BP 80-89 MM HG: CPT | Mod: ,,, | Performed by: NURSE PRACTITIONER

## 2022-11-02 PROCEDURE — 87428 SARSCOV & INF VIR A&B AG IA: CPT | Mod: QW,,, | Performed by: NURSE PRACTITIONER

## 2022-11-02 PROCEDURE — 3066F PR DOCUMENTATION OF TREATMENT FOR NEPHROPATHY: ICD-10-PCS | Mod: ,,, | Performed by: NURSE PRACTITIONER

## 2022-11-02 PROCEDURE — 87428 POCT SARS-COV2 (COVID) WITH FLU ANTIGEN: ICD-10-PCS | Mod: QW,,, | Performed by: NURSE PRACTITIONER

## 2022-11-02 PROCEDURE — 1125F AMNT PAIN NOTED PAIN PRSNT: CPT | Mod: ,,, | Performed by: NURSE PRACTITIONER

## 2022-11-02 PROCEDURE — 3077F SYST BP >= 140 MM HG: CPT | Mod: ,,, | Performed by: NURSE PRACTITIONER

## 2022-11-02 PROCEDURE — 99213 PR OFFICE/OUTPT VISIT, EST, LEVL III, 20-29 MIN: ICD-10-PCS | Mod: ,,, | Performed by: NURSE PRACTITIONER

## 2022-11-02 PROCEDURE — 3061F NEG MICROALBUMINURIA REV: CPT | Mod: ,,, | Performed by: NURSE PRACTITIONER

## 2022-11-02 PROCEDURE — 1125F PR PAIN SEVERITY QUANTIFIED, PAIN PRESENT: ICD-10-PCS | Mod: ,,, | Performed by: NURSE PRACTITIONER

## 2022-11-02 PROCEDURE — 3008F PR BODY MASS INDEX (BMI) DOCUMENTED: ICD-10-PCS | Mod: ,,, | Performed by: NURSE PRACTITIONER

## 2022-11-02 PROCEDURE — 99213 OFFICE O/P EST LOW 20 MIN: CPT | Mod: ,,, | Performed by: NURSE PRACTITIONER

## 2022-11-02 PROCEDURE — 3077F PR MOST RECENT SYSTOLIC BLOOD PRESSURE >= 140 MM HG: ICD-10-PCS | Mod: ,,, | Performed by: NURSE PRACTITIONER

## 2022-11-02 PROCEDURE — 3008F BODY MASS INDEX DOCD: CPT | Mod: ,,, | Performed by: NURSE PRACTITIONER

## 2022-11-02 RX ORDER — PROMETHAZINE HYDROCHLORIDE AND DEXTROMETHORPHAN HYDROBROMIDE 6.25; 15 MG/5ML; MG/5ML
5 SYRUP ORAL EVERY 4 HOURS PRN
Qty: 240 ML | Refills: 0 | Status: SHIPPED | OUTPATIENT
Start: 2022-11-02 | End: 2023-03-21

## 2022-11-02 RX ORDER — DEXAMETHASONE 4 MG/1
4 TABLET ORAL EVERY 12 HOURS
Qty: 10 TABLET | Refills: 0 | Status: SHIPPED | OUTPATIENT
Start: 2022-11-02 | End: 2022-11-07

## 2022-11-02 RX ORDER — AZITHROMYCIN 250 MG/1
TABLET, FILM COATED ORAL
Qty: 6 TABLET | Refills: 0 | Status: SHIPPED | OUTPATIENT
Start: 2022-11-02 | End: 2023-03-21 | Stop reason: ALTCHOICE

## 2022-11-09 ENCOUNTER — OFFICE VISIT (OUTPATIENT)
Dept: FAMILY MEDICINE | Facility: CLINIC | Age: 72
End: 2022-11-09
Payer: COMMERCIAL

## 2022-11-09 VITALS
HEART RATE: 57 BPM | DIASTOLIC BLOOD PRESSURE: 83 MMHG | OXYGEN SATURATION: 100 % | WEIGHT: 154 LBS | RESPIRATION RATE: 18 BRPM | TEMPERATURE: 98 F | HEIGHT: 63 IN | SYSTOLIC BLOOD PRESSURE: 154 MMHG | BODY MASS INDEX: 27.29 KG/M2

## 2022-11-09 DIAGNOSIS — Z71.89 COMPLEX CARE COORDINATION: ICD-10-CM

## 2022-11-09 DIAGNOSIS — Z11.52 ENCOUNTER FOR SCREENING LABORATORY TESTING FOR COVID-19 VIRUS: Primary | ICD-10-CM

## 2022-11-09 LAB
CTP QC/QA: YES
SARS-COV-2 AG RESP QL IA.RAPID: POSITIVE

## 2022-11-09 PROCEDURE — 1159F MED LIST DOCD IN RCRD: CPT | Mod: ,,, | Performed by: FAMILY MEDICINE

## 2022-11-09 PROCEDURE — 3079F PR MOST RECENT DIASTOLIC BLOOD PRESSURE 80-89 MM HG: ICD-10-PCS | Mod: ,,, | Performed by: FAMILY MEDICINE

## 2022-11-09 PROCEDURE — 3061F NEG MICROALBUMINURIA REV: CPT | Mod: ,,, | Performed by: FAMILY MEDICINE

## 2022-11-09 PROCEDURE — 3008F BODY MASS INDEX DOCD: CPT | Mod: ,,, | Performed by: FAMILY MEDICINE

## 2022-11-09 PROCEDURE — 99213 OFFICE O/P EST LOW 20 MIN: CPT | Mod: ,,, | Performed by: FAMILY MEDICINE

## 2022-11-09 PROCEDURE — 3061F PR NEG MICROALBUMINURIA RESULT DOCUMENTED/REVIEW: ICD-10-PCS | Mod: ,,, | Performed by: FAMILY MEDICINE

## 2022-11-09 PROCEDURE — 87426 SARS CORONAVIRUS 2 ANTIGEN POCT: ICD-10-PCS | Mod: QW,,, | Performed by: FAMILY MEDICINE

## 2022-11-09 PROCEDURE — 3066F PR DOCUMENTATION OF TREATMENT FOR NEPHROPATHY: ICD-10-PCS | Mod: ,,, | Performed by: FAMILY MEDICINE

## 2022-11-09 PROCEDURE — 1159F PR MEDICATION LIST DOCUMENTED IN MEDICAL RECORD: ICD-10-PCS | Mod: ,,, | Performed by: FAMILY MEDICINE

## 2022-11-09 PROCEDURE — 87426 SARSCOV CORONAVIRUS AG IA: CPT | Mod: QW,,, | Performed by: FAMILY MEDICINE

## 2022-11-09 PROCEDURE — 3079F DIAST BP 80-89 MM HG: CPT | Mod: ,,, | Performed by: FAMILY MEDICINE

## 2022-11-09 PROCEDURE — 1160F PR REVIEW ALL MEDS BY PRESCRIBER/CLIN PHARMACIST DOCUMENTED: ICD-10-PCS | Mod: ,,, | Performed by: FAMILY MEDICINE

## 2022-11-09 PROCEDURE — 1160F RVW MEDS BY RX/DR IN RCRD: CPT | Mod: ,,, | Performed by: FAMILY MEDICINE

## 2022-11-09 PROCEDURE — 3077F SYST BP >= 140 MM HG: CPT | Mod: ,,, | Performed by: FAMILY MEDICINE

## 2022-11-09 PROCEDURE — 3008F PR BODY MASS INDEX (BMI) DOCUMENTED: ICD-10-PCS | Mod: ,,, | Performed by: FAMILY MEDICINE

## 2022-11-09 PROCEDURE — 3066F NEPHROPATHY DOC TX: CPT | Mod: ,,, | Performed by: FAMILY MEDICINE

## 2022-11-09 PROCEDURE — 3077F PR MOST RECENT SYSTOLIC BLOOD PRESSURE >= 140 MM HG: ICD-10-PCS | Mod: ,,, | Performed by: FAMILY MEDICINE

## 2022-11-09 PROCEDURE — 99213 PR OFFICE/OUTPT VISIT, EST, LEVL III, 20-29 MIN: ICD-10-PCS | Mod: ,,, | Performed by: FAMILY MEDICINE

## 2022-11-09 NOTE — LETTER
November 9, 2022      [facility]  [address] [facility] [address]  Phone: [phone]  Fax: [phone]       Patient: Jeff Starr   YOB: 1950  Date of Visit: 11/09/2022    To Whom It May Concern:    Rigo Starr  was at [facility] on 11/09/2022. The patient may return to work/school on 11/12/2022 with no restrictions. If you have any questions or concerns, or if I can be of further assistance, please do not hesitate to contact me.    Sincerely,    Dr. Dontrell Ram II

## 2022-11-09 NOTE — PROGRESS NOTES
Subjective:       Patient ID: Jeff Starr is a 72 y.o. male.    Chief Complaint: COVID-19 Concerns (Retested for covid. Tested positive on 11/2/22)    HPI  Review of Systems   Constitutional:  Negative for activity change, appetite change, chills, diaphoresis, fatigue, fever and unexpected weight change.   HENT:  Negative for nasal congestion, dental problem, drooling, ear discharge, ear pain, facial swelling, hearing loss, mouth sores, nosebleeds, postnasal drip, rhinorrhea, sinus pressure/congestion, sneezing, sore throat, tinnitus, trouble swallowing, voice change and goiter.    Eyes:  Negative for photophobia, pain, discharge, redness, itching and visual disturbance.   Respiratory:  Negative for apnea, cough, choking, chest tightness, shortness of breath, wheezing and stridor.    Cardiovascular:  Negative for chest pain, palpitations, leg swelling and claudication.   Gastrointestinal:  Negative for abdominal distention, abdominal pain, anal bleeding, blood in stool, change in bowel habit, constipation, diarrhea, nausea, vomiting, reflux, fecal incontinence and change in bowel habit.   Endocrine: Negative for cold intolerance, heat intolerance, polydipsia, polyphagia and polyuria.   Genitourinary:  Negative for bladder incontinence, decreased urine volume, difficulty urinating, discharge, dysuria, enuresis, erectile dysfunction, flank pain, frequency, genital sores, hematuria, penile pain, testicular pain and urgency.   Musculoskeletal:  Negative for arthralgias, back pain, gait problem, joint swelling, leg pain, myalgias, neck pain, neck stiffness and joint deformity.   Integumentary:  Negative for pallor, rash, wound and mole/lesion.   Allergic/Immunologic: Negative for environmental allergies, food allergies and frequent infections.   Neurological:  Negative for dizziness, vertigo, tremors, seizures, syncope, facial asymmetry, speech difficulty, weakness, light-headedness, numbness, headaches,  coordination difficulties, memory loss and coordination difficulties.   Hematological:  Negative for adenopathy. Does not bruise/bleed easily.   Psychiatric/Behavioral:  Negative for agitation, behavioral problems, confusion, decreased concentration, dysphoric mood, hallucinations, self-injury, sleep disturbance and suicidal ideas. The patient is not nervous/anxious and is not hyperactive.        Objective:      Physical Exam  Vitals reviewed.   Constitutional:       Appearance: Normal appearance. He is normal weight.   HENT:      Head: Normocephalic and atraumatic.      Right Ear: Tympanic membrane and ear canal normal.      Left Ear: Tympanic membrane, ear canal and external ear normal.      Nose: Nose normal.      Mouth/Throat:      Mouth: Mucous membranes are moist.      Pharynx: Oropharynx is clear.   Eyes:      Extraocular Movements: Extraocular movements intact.      Conjunctiva/sclera: Conjunctivae normal.      Pupils: Pupils are equal, round, and reactive to light.   Cardiovascular:      Rate and Rhythm: Normal rate and regular rhythm.      Pulses: Normal pulses.      Heart sounds: Normal heart sounds.   Pulmonary:      Effort: Pulmonary effort is normal.      Breath sounds: Normal breath sounds.   Abdominal:      General: Abdomen is flat. Bowel sounds are normal.      Palpations: Abdomen is soft.   Musculoskeletal:         General: Normal range of motion.      Cervical back: Normal range of motion and neck supple.   Skin:     General: Skin is warm and dry.   Neurological:      General: No focal deficit present.      Mental Status: He is alert and oriented to person, place, and time. Mental status is at baseline.   Psychiatric:         Mood and Affect: Mood normal.         Behavior: Behavior normal.         Thought Content: Thought content normal.         Judgment: Judgment normal.       Assessment:       1. Encounter for screening laboratory testing for COVID-19 virus          Plan:     Encounter for  screening laboratory testing for COVID-19 virus  -     SARS Coronavirus 2 Antigen, POCT     Covid test positive, however since it's been over a week recommend patient to go back to work this weekend since his symptoms have resolved.

## 2022-11-15 NOTE — PROGRESS NOTES
[facility]      PATIENT NAME: Jeff Starr   : 1950    AGE: 72 y.o. DATE: 2022   MRN: [id number]        Reason for Visit / Chief Complaint: Medicare AWV ( Initial [employer] AWV )        Jeff Starr presents for a Subsequent [employer] AWV today.     The following components were reviewed and updated:    Medical/Social/Family History:  Past Medical History:   Diagnosis Date    COVID     HTN (hypertension)         Family History   Problem Relation Age of Onset    Hypertension Mother     Diabetes Father     Hypertension Father     Coronary artery disease Father     Heart attack Father         Past Surgical History:   Procedure Laterality Date    SHOULDER SURGERY Right        Social History     Tobacco Use   Smoking Status Every Day    Packs/day: 0.50    Years: 50.00    Pack years: 25.00    Types: Cigarettes    Start date:    Smokeless Tobacco Never       Social History     Substance and Sexual Activity   Alcohol Use Not Currently         Allergies and Current Medications   Review of patient's allergies indicates:  No Known Allergies    Current Outpatient Medications:     albuterol (PROVENTIL/VENTOLIN HFA) 90 mcg/actuation inhaler, Inhale 1 puff into the lungs every 4 (four) hours as needed for Shortness of Breath., Disp: 18 g, Rfl: 3    amLODIPine (NORVASC) 10 MG tablet, Take 1 tablet (10 mg total) by mouth once daily., Disp: 90 tablet, Rfl: 1    amoxicillin (AMOXIL) 500 MG capsule, Take 500 mg by mouth every 8 (eight) hours., Disp: , Rfl:     benzonatate (TESSALON) 100 MG capsule, One to two capsules three times daily as needed for cough, Disp: 30 capsule, Rfl: 0    CHLORPHENIRAMINE MALEATE,BULK, MISC, 4 mg by Misc.(Non-Drug; Combo Route) route., Disp: , Rfl:     dexAMETHasone (DECADRON) 1 MG Tab, SMARTSI Tablet(s) By Mouth Every 12 Hours, Disp: , Rfl:     LIDOcaine (LMX) 4 % cream, Apply topically as needed., Disp: , Rfl:     loratadine (CLARITIN) 10 mg tablet,  Take 10 mg by mouth once daily., Disp: , Rfl:     naproxen (NAPROSYN) 500 MG tablet, Take 1 tablet (500 mg total) by mouth 2 (two) times daily., Disp: 20 tablet, Rfl: 2    naproxen (NAPROSYN) 500 MG tablet, Take 1 tablet (500 mg total) by mouth 2 (two) times daily as needed (pain)., Disp: 30 tablet, Rfl: 2    oxymetazoline (AFRIN) 0.05 % nasal spray, 2 sprays by Nasal route 2 (two) times daily., Disp: , Rfl:     promethazine-dextromethorphan (PROMETHAZINE-DM) 6.25-15 mg/5 mL Syrp, Take 5 mLs by mouth every 4 (four) hours as needed (cough)., Disp: 240 mL, Rfl: 0    azithromycin (Z-ALF) 250 MG tablet, TAKE TWO TABS PO ON DAY 1, THEN TAKE ONE TAB A DAY FOR 4 DAYS (Patient not taking: Reported on 11/16/2022), Disp: 6 tablet, Rfl: 0    gabapentin (NEURONTIN) 300 MG capsule, Take 1 capsule (300 mg total) by mouth every evening. (Patient not taking: Reported on 11/16/2022), Disp: 90 capsule, Rfl: 0    neomycin-polymyxin-dexamethasone (DEXACINE) 3.5 mg/g-10,000 unit/g-0.1 % Oint, , Disp: , Rfl:       Health Risk Assessment   Fall Risk: no   Obesity: BMI Body mass index is 27.28 kg/m².   Advance Directive:  Does not have an advanced directive. Verbal information and written information provided on today's AVS.   Depression: PHQ9- 0   HTN:DASH diet, exercise, weight management, med compliance, home BP monitoring, and follow-up discussed.   STI: not at risk    Statin Use: no      Health Maintenance   [facility]    Last CV screen with lipids: 04/14/2022   Diabetes screening with fasting glucose or A1c: available   Colonoscopy: [facility] request sent copy report    Flu Vaccine: available    Pneumonia vaccines: available    COVID vaccine: 01/20/2021, 02/26/2021, 11/04/2021, 06/16/2022   Hep B vaccine: na   DEXA: na   Last PSA screen: 10/18/36581   AAA screening: available    HIV Screeing: declines   Hepatitis C Screen: declines   Low Dose CT Scan: ordered  10/18/2022    Health Maintenance Topics with due status: Not Due       Topic Last Completion Date    TETANUS VACCINE 07/13/2015    Lipid Panel 04/14/2022     Health Maintenance Due   Topic Date Due    Hepatitis C Screening  Never done    Pneumococcal Vaccines (Age 65+) (1 - PCV) Never done    Colorectal Cancer Screening  Never done    LDCT Lung Screen  Never done    Shingles Vaccine (1 of 2) Never done    Abdominal Aortic Aneurysm Screening  Never done    COVID-19 Vaccine (5 - Booster for Moderna series) 08/11/2022    Influenza Vaccine (1) Never done         Lab results available in Epic or see dates from [facility] above:   Lab Results   Component Value Date    CHOL 151 04/14/2022    CHOL 148 06/03/2021     Lab Results   Component Value Date    HDL 38 (L) 04/14/2022    HDL 38 (L) 06/03/2021     Lab Results   Component Value Date    LDLCALC 97 04/14/2022    LDLCALC 96 06/03/2021     Lab Results   Component Value Date    TRIG 79 04/14/2022    TRIG 70 06/03/2021         No results found for: LABA1C, HGBA1C    Sodium   Date Value Ref Range Status   10/18/2022 140 136 - 145 mmol/L Final     Potassium   Date Value Ref Range Status   10/18/2022 4.2 3.5 - 5.1 mmol/L Final     Chloride   Date Value Ref Range Status   10/18/2022 110 (H) 98 - 107 mmol/L Final     CO2   Date Value Ref Range Status   10/18/2022 28 21 - 32 mmol/L Final     Glucose   Date Value Ref Range Status   10/18/2022 83 74 - 106 mg/dL Final     BUN   Date Value Ref Range Status   10/18/2022 12 7 - 18 mg/dL Final     Creatinine   Date Value Ref Range Status   10/18/2022 0.95 0.70 - 1.30 mg/dL Final     Calcium   Date Value Ref Range Status   10/18/2022 8.5 8.5 - 10.1 mg/dL Final     Total Protein   Date Value Ref Range Status   03/11/2020 7.2 6.4 - 8.2 g/dL      Albumin   Date Value Ref Range Status   10/18/2022 3.8 3.5 - 5.0 g/dL Final     Bilirubin, Total   Date Value Ref Range Status   03/11/2020 0.6 0.0 - 1.2 mg/dL      Alk Phos   Date Value Ref Range Status  "  03/11/2020 78 45 - 115 U/L      Comment:     No reference range established for children less than 4 years of age.     AST   Date Value Ref Range Status   03/11/2020 34 15 - 37 U/L      ALT   Date Value Ref Range Status   03/11/2020 34 16 - 61 U/L      Anion Gap   Date Value Ref Range Status   10/18/2022 6 (L) 7 - 16 mmol/L Final     eGFR    Date Value Ref Range Status   06/03/2021 101 >=60 mL/min/1.73m² Final     eGFR   Date Value Ref Range Status   04/14/2022 84 >=60 mL/min/1.73m² Final         Lab Results   Component Value Date    PSA 1.080 10/18/2022           Incontinence  Bowel: no  Bladder: no      Care Team  Cain carusop pcp                  [facility]        **See Completed Assessments for Annual Wellness visit within the encounter summary    The following assessments were completed & reviewed:  Depression Screening  Cognitive function Screening  Timed Get Up Test  Whisper Test  Vision Screen  Health Risk Assessment  Checklist of ADLs and IADLs        Objective  Vitals:    11/16/22 1515   BP: 134/80   Pulse: 95   Resp: 16   Temp: 98.6 °F (37 °C)   SpO2: 99%   Weight: 69.9 kg (154 lb)   Height: 5' 3" (1.6 m)   PainSc: 0-No pain      Body mass index is 27.28 kg/m².  Ideal body weight: 56.9 kg (125 lb 7.1 oz)       Physical Exam      Assessment:     1. Encounter for subsequent annual wellness visit (AWV) in Medicare patient    2. Chronic obstructive pulmonary disease, unspecified COPD type    3. Osteoarthritis of multiple joints, unspecified osteoarthritis type    4. Essential hypertension    5. BMI 27.0-27.9,adult    6. Smoking greater than 20 pack years    7. Encounter for screening for diabetes mellitus       Problem List Items Addressed This Visit          Pulmonary    Chronic obstructive pulmonary disease       Orthopedic    Osteoarthritis of multiple joints     Other Visit Diagnoses       Encounter for subsequent annual wellness visit (AWV) in Medicare patient    " -  Primary    Essential hypertension        BMI 27.0-27.9,adult        Smoking greater than 20 pack years        Encounter for screening for diabetes mellitus                  Plan:    Referrals:        Advised to call office if does not hear from anyone with referral appt within 2-3 weeks to check on status of referral. Voiced understanding.      Discussed and provided with a screening schedule and personal prevention plan in accordance with USPSTF age appropriate recommendations and Medicare screening guidelines.   Education, counseling, and referrals were provided as needed.  After Visit Summary printed and given to patient which includes written education and a list of any referrals if indicated.     Education including diet, exercise, falls and advanced directives discussed with patient and patient verbalized understanding.      Assistance with smoking cessation was offered, including:  [x]  Medications  [x]  Counseling  [x]  Printed Information on Smoking Cessation  []  Referral to a Smoking Cessation Program    Patient was counseled regarding smoking for 3-10 minutes.     F/u plan for yearly AWV.    Signature: KACI Evans

## 2022-11-16 ENCOUNTER — OFFICE VISIT (OUTPATIENT)
Dept: FAMILY MEDICINE | Facility: CLINIC | Age: 72
End: 2022-11-16
Payer: COMMERCIAL

## 2022-11-16 VITALS
TEMPERATURE: 99 F | OXYGEN SATURATION: 99 % | BODY MASS INDEX: 27.29 KG/M2 | SYSTOLIC BLOOD PRESSURE: 134 MMHG | WEIGHT: 154 LBS | HEART RATE: 95 BPM | HEIGHT: 63 IN | DIASTOLIC BLOOD PRESSURE: 80 MMHG | RESPIRATION RATE: 16 BRPM

## 2022-11-16 DIAGNOSIS — M15.9 OSTEOARTHRITIS OF MULTIPLE JOINTS, UNSPECIFIED OSTEOARTHRITIS TYPE: ICD-10-CM

## 2022-11-16 DIAGNOSIS — I10 ESSENTIAL HYPERTENSION: ICD-10-CM

## 2022-11-16 DIAGNOSIS — Z00.00 ENCOUNTER FOR SUBSEQUENT ANNUAL WELLNESS VISIT (AWV) IN MEDICARE PATIENT: Primary | ICD-10-CM

## 2022-11-16 DIAGNOSIS — J44.9 CHRONIC OBSTRUCTIVE PULMONARY DISEASE, UNSPECIFIED COPD TYPE: ICD-10-CM

## 2022-11-16 DIAGNOSIS — F17.210 SMOKING GREATER THAN 20 PACK YEARS: ICD-10-CM

## 2022-11-16 DIAGNOSIS — Z13.1 ENCOUNTER FOR SCREENING FOR DIABETES MELLITUS: ICD-10-CM

## 2022-11-16 PROCEDURE — G0008 FLU VACCINE - QUADRIVALENT - ADJUVANTED: ICD-10-PCS | Mod: ,,, | Performed by: NURSE PRACTITIONER

## 2022-11-16 PROCEDURE — 1159F PR MEDICATION LIST DOCUMENTED IN MEDICAL RECORD: ICD-10-PCS | Mod: ,,, | Performed by: NURSE PRACTITIONER

## 2022-11-16 PROCEDURE — 3061F PR NEG MICROALBUMINURIA RESULT DOCUMENTED/REVIEW: ICD-10-PCS | Mod: ,,, | Performed by: NURSE PRACTITIONER

## 2022-11-16 PROCEDURE — G0438 PPPS, INITIAL VISIT: HCPCS | Mod: ,,, | Performed by: NURSE PRACTITIONER

## 2022-11-16 PROCEDURE — 1126F PR PAIN SEVERITY QUANTIFIED, NO PAIN PRESENT: ICD-10-PCS | Mod: ,,, | Performed by: NURSE PRACTITIONER

## 2022-11-16 PROCEDURE — 1159F MED LIST DOCD IN RCRD: CPT | Mod: ,,, | Performed by: NURSE PRACTITIONER

## 2022-11-16 PROCEDURE — 1126F AMNT PAIN NOTED NONE PRSNT: CPT | Mod: ,,, | Performed by: NURSE PRACTITIONER

## 2022-11-16 PROCEDURE — 3079F PR MOST RECENT DIASTOLIC BLOOD PRESSURE 80-89 MM HG: ICD-10-PCS | Mod: ,,, | Performed by: NURSE PRACTITIONER

## 2022-11-16 PROCEDURE — 99406 BEHAV CHNG SMOKING 3-10 MIN: CPT | Mod: ,,, | Performed by: NURSE PRACTITIONER

## 2022-11-16 PROCEDURE — 3008F PR BODY MASS INDEX (BMI) DOCUMENTED: ICD-10-PCS | Mod: ,,, | Performed by: NURSE PRACTITIONER

## 2022-11-16 PROCEDURE — 90677 PCV20 VACCINE IM: CPT | Mod: ,,, | Performed by: NURSE PRACTITIONER

## 2022-11-16 PROCEDURE — 3008F BODY MASS INDEX DOCD: CPT | Mod: ,,, | Performed by: NURSE PRACTITIONER

## 2022-11-16 PROCEDURE — G0009 ADMIN PNEUMOCOCCAL VACCINE: HCPCS | Mod: ,,, | Performed by: NURSE PRACTITIONER

## 2022-11-16 PROCEDURE — 99406 PR TOBACCO USE CESSATION INTERMEDIATE 3-10 MINUTES: ICD-10-PCS | Mod: ,,, | Performed by: NURSE PRACTITIONER

## 2022-11-16 PROCEDURE — 90677 PNEUMOCOCCAL CONJUGATE VACCINE 20-VALENT: ICD-10-PCS | Mod: ,,, | Performed by: NURSE PRACTITIONER

## 2022-11-16 PROCEDURE — 3061F NEG MICROALBUMINURIA REV: CPT | Mod: ,,, | Performed by: NURSE PRACTITIONER

## 2022-11-16 PROCEDURE — G0009 PNEUMOCOCCAL CONJUGATE VACCINE 20-VALENT: ICD-10-PCS | Mod: ,,, | Performed by: NURSE PRACTITIONER

## 2022-11-16 PROCEDURE — 3075F SYST BP GE 130 - 139MM HG: CPT | Mod: ,,, | Performed by: NURSE PRACTITIONER

## 2022-11-16 PROCEDURE — G0438 PR WELCOME MEDICARE ANNUAL WELLNESS INITIAL VISIT: ICD-10-PCS | Mod: ,,, | Performed by: NURSE PRACTITIONER

## 2022-11-16 PROCEDURE — 90694 FLU VACCINE - QUADRIVALENT - ADJUVANTED: ICD-10-PCS | Mod: ,,, | Performed by: NURSE PRACTITIONER

## 2022-11-16 PROCEDURE — 1101F PT FALLS ASSESS-DOCD LE1/YR: CPT | Mod: ,,, | Performed by: NURSE PRACTITIONER

## 2022-11-16 PROCEDURE — 3066F NEPHROPATHY DOC TX: CPT | Mod: ,,, | Performed by: NURSE PRACTITIONER

## 2022-11-16 PROCEDURE — 3075F PR MOST RECENT SYSTOLIC BLOOD PRESS GE 130-139MM HG: ICD-10-PCS | Mod: ,,, | Performed by: NURSE PRACTITIONER

## 2022-11-16 PROCEDURE — 90694 VACC AIIV4 NO PRSRV 0.5ML IM: CPT | Mod: ,,, | Performed by: NURSE PRACTITIONER

## 2022-11-16 PROCEDURE — 3079F DIAST BP 80-89 MM HG: CPT | Mod: ,,, | Performed by: NURSE PRACTITIONER

## 2022-11-16 PROCEDURE — 1101F PR PT FALLS ASSESS DOC 0-1 FALLS W/OUT INJ PAST YR: ICD-10-PCS | Mod: ,,, | Performed by: NURSE PRACTITIONER

## 2022-11-16 PROCEDURE — 3066F PR DOCUMENTATION OF TREATMENT FOR NEPHROPATHY: ICD-10-PCS | Mod: ,,, | Performed by: NURSE PRACTITIONER

## 2022-11-16 PROCEDURE — 3288F FALL RISK ASSESSMENT DOCD: CPT | Mod: ,,, | Performed by: NURSE PRACTITIONER

## 2022-11-16 PROCEDURE — G0008 ADMIN INFLUENZA VIRUS VAC: HCPCS | Mod: ,,, | Performed by: NURSE PRACTITIONER

## 2022-11-16 PROCEDURE — 3288F PR FALLS RISK ASSESSMENT DOCUMENTED: ICD-10-PCS | Mod: ,,, | Performed by: NURSE PRACTITIONER

## 2022-11-16 RX ORDER — DEXAMETHASONE 1 MG/1
TABLET ORAL
COMMUNITY
Start: 2022-11-02 | End: 2023-03-21

## 2022-11-16 RX ORDER — LORATADINE 10 MG/1
10 TABLET ORAL DAILY
COMMUNITY
End: 2024-03-20 | Stop reason: SDUPTHER

## 2022-11-16 RX ORDER — AMOXICILLIN 500 MG/1
500 CAPSULE ORAL EVERY 8 HOURS
COMMUNITY
Start: 2022-10-13 | End: 2023-03-21 | Stop reason: ALTCHOICE

## 2022-11-16 RX ORDER — LIDOCAINE 40 MG/G
CREAM TOPICAL
COMMUNITY
End: 2023-06-22 | Stop reason: SDUPTHER

## 2022-11-16 RX ORDER — OXYMETAZOLINE HCL 0.05 %
2 SPRAY, NON-AEROSOL (ML) NASAL 2 TIMES DAILY
COMMUNITY
End: 2023-03-21

## 2022-11-16 NOTE — PATIENT INSTRUCTIONS
Counseling and Referral of Other Preventative  (Italic type indicates deductible and co-insurance are waived)    Patient Name: Jeff Starr  Today's Date: 11/16/2022    Health Maintenance         Date Due Completion Date    Hepatitis C Screening Never done ---    Pneumococcal Vaccines (Age 65+) (1 - PCV) Never done ---    Colorectal Cancer Screening Never done ---    Shingles Vaccine (1 of 2) Never done ---    Abdominal Aortic Aneurysm Screening Never done ---    COVID-19 Vaccine (5 - Booster for Moderna series) 08/11/2022 6/16/2022    Influenza Vaccine (1) Never done ---    TETANUS VACCINE 07/13/2025 7/13/2015    Lipid Panel 04/14/2027 4/14/2022          Orders Placed This Encounter   Procedures    US AAA Screening    Influenza (FLUAD) - Quadrivalent (Adjuvanted) *Preferred* (65+) (PF)    (In Office Administered) Pneumococcal Conjugate Vaccine (20 Valent) (IM)    Comprehensive Metabolic Panel    Hemoglobin A1C

## 2022-12-06 ENCOUNTER — HOSPITAL ENCOUNTER (OUTPATIENT)
Dept: RADIOLOGY | Facility: HOSPITAL | Age: 72
Discharge: HOME | End: 2022-12-06
Attending: NURSE PRACTITIONER
Payer: COMMERCIAL

## 2022-12-06 DIAGNOSIS — F17.210 SMOKING GREATER THAN 20 PACK YEARS: ICD-10-CM

## 2022-12-06 PROCEDURE — 76706 US ABDL AORTA SCREEN AAA: CPT | Mod: TC

## 2022-12-06 PROCEDURE — 76706 US AAA SCREENING: ICD-10-PCS | Mod: 26,,, | Performed by: STUDENT IN AN ORGANIZED HEALTH CARE EDUCATION/TRAINING PROGRAM

## 2022-12-06 PROCEDURE — 76706 US ABDL AORTA SCREEN AAA: CPT | Mod: 26,,, | Performed by: STUDENT IN AN ORGANIZED HEALTH CARE EDUCATION/TRAINING PROGRAM

## 2022-12-08 ENCOUNTER — TELEPHONE (OUTPATIENT)
Dept: FAMILY MEDICINE | Facility: CLINIC | Age: 72
End: 2022-12-08
Payer: COMMERCIAL

## 2022-12-08 NOTE — TELEPHONE ENCOUNTER
----- Message from Cain Santoyo NP sent at 12/6/2022 10:15 AM CST -----  Let pt know his abd u/s is normal.

## 2022-12-27 DIAGNOSIS — Z12.11 COLON CANCER SCREENING: Primary | ICD-10-CM

## 2023-03-21 ENCOUNTER — OFFICE VISIT (OUTPATIENT)
Dept: FAMILY MEDICINE | Facility: CLINIC | Age: 73
End: 2023-03-21
Payer: COMMERCIAL

## 2023-03-21 VITALS
TEMPERATURE: 98 F | BODY MASS INDEX: 25.61 KG/M2 | HEART RATE: 82 BPM | OXYGEN SATURATION: 97 % | WEIGHT: 150 LBS | DIASTOLIC BLOOD PRESSURE: 84 MMHG | SYSTOLIC BLOOD PRESSURE: 128 MMHG | RESPIRATION RATE: 18 BRPM | HEIGHT: 64 IN

## 2023-03-21 DIAGNOSIS — Z13.1 SCREENING FOR DIABETES MELLITUS: ICD-10-CM

## 2023-03-21 DIAGNOSIS — M15.9 OSTEOARTHRITIS OF MULTIPLE JOINTS, UNSPECIFIED OSTEOARTHRITIS TYPE: ICD-10-CM

## 2023-03-21 DIAGNOSIS — I10 PRIMARY HYPERTENSION: Primary | ICD-10-CM

## 2023-03-21 DIAGNOSIS — G89.29 CHRONIC BILATERAL LOW BACK PAIN WITHOUT SCIATICA: ICD-10-CM

## 2023-03-21 DIAGNOSIS — M54.50 CHRONIC BILATERAL LOW BACK PAIN WITHOUT SCIATICA: ICD-10-CM

## 2023-03-21 PROBLEM — M06.9 RHEUMATOID ARTHRITIS: Status: ACTIVE | Noted: 2023-03-21

## 2023-03-21 PROBLEM — K05.319: Status: ACTIVE | Noted: 2023-03-21

## 2023-03-21 PROBLEM — K05.10 CHRONIC GINGIVITIS: Status: ACTIVE | Noted: 2023-03-21

## 2023-03-21 PROBLEM — Z72.0 TOBACCO USER: Status: ACTIVE | Noted: 2023-03-21

## 2023-03-21 PROBLEM — J30.2 SEASONAL ALLERGIC RHINITIS: Status: ACTIVE | Noted: 2023-03-21

## 2023-03-21 LAB
ALBUMIN SERPL BCP-MCNC: 3.9 G/DL (ref 3.5–5)
ALBUMIN/GLOB SERPL: 1 {RATIO}
ALP SERPL-CCNC: 85 U/L (ref 45–115)
ALT SERPL W P-5'-P-CCNC: 45 U/L (ref 16–61)
ANION GAP SERPL CALCULATED.3IONS-SCNC: 7 MMOL/L (ref 7–16)
AST SERPL W P-5'-P-CCNC: 41 U/L (ref 15–37)
BILIRUB SERPL-MCNC: 0.9 MG/DL (ref ?–1.2)
BUN SERPL-MCNC: 10 MG/DL (ref 7–18)
BUN/CREAT SERPL: 11 (ref 6–20)
CALCIUM SERPL-MCNC: 9.3 MG/DL (ref 8.5–10.1)
CHLORIDE SERPL-SCNC: 111 MMOL/L (ref 98–107)
CO2 SERPL-SCNC: 28 MMOL/L (ref 21–32)
CREAT SERPL-MCNC: 0.94 MG/DL (ref 0.7–1.3)
EGFR (NO RACE VARIABLE) (RUSH/TITUS): 87 ML/MIN/1.73M²
EST. AVERAGE GLUCOSE BLD GHB EST-MCNC: 84 MG/DL
GLOBULIN SER-MCNC: 3.8 G/DL (ref 2–4)
GLUCOSE SERPL-MCNC: 74 MG/DL (ref 74–106)
HBA1C MFR BLD HPLC: 5.1 % (ref 4.5–6.6)
POTASSIUM SERPL-SCNC: 4 MMOL/L (ref 3.5–5.1)
PROT SERPL-MCNC: 7.7 G/DL (ref 6.4–8.2)
SODIUM SERPL-SCNC: 142 MMOL/L (ref 136–145)

## 2023-03-21 PROCEDURE — 3008F BODY MASS INDEX DOCD: CPT | Mod: ,,, | Performed by: NURSE PRACTITIONER

## 2023-03-21 PROCEDURE — 1160F PR REVIEW ALL MEDS BY PRESCRIBER/CLIN PHARMACIST DOCUMENTED: ICD-10-PCS | Mod: ,,, | Performed by: NURSE PRACTITIONER

## 2023-03-21 PROCEDURE — 1160F RVW MEDS BY RX/DR IN RCRD: CPT | Mod: ,,, | Performed by: NURSE PRACTITIONER

## 2023-03-21 PROCEDURE — 3079F DIAST BP 80-89 MM HG: CPT | Mod: ,,, | Performed by: NURSE PRACTITIONER

## 2023-03-21 PROCEDURE — 83036 HEMOGLOBIN GLYCOSYLATED A1C: CPT | Mod: GZ,,, | Performed by: CLINICAL MEDICAL LABORATORY

## 2023-03-21 PROCEDURE — 96372 THER/PROPH/DIAG INJ SC/IM: CPT | Mod: ,,, | Performed by: NURSE PRACTITIONER

## 2023-03-21 PROCEDURE — 1125F PR PAIN SEVERITY QUANTIFIED, PAIN PRESENT: ICD-10-PCS | Mod: ,,, | Performed by: NURSE PRACTITIONER

## 2023-03-21 PROCEDURE — 1159F MED LIST DOCD IN RCRD: CPT | Mod: ,,, | Performed by: NURSE PRACTITIONER

## 2023-03-21 PROCEDURE — 80053 COMPREHENSIVE METABOLIC PANEL: ICD-10-PCS | Mod: ,,, | Performed by: CLINICAL MEDICAL LABORATORY

## 2023-03-21 PROCEDURE — 3079F PR MOST RECENT DIASTOLIC BLOOD PRESSURE 80-89 MM HG: ICD-10-PCS | Mod: ,,, | Performed by: NURSE PRACTITIONER

## 2023-03-21 PROCEDURE — 99213 OFFICE O/P EST LOW 20 MIN: CPT | Mod: 25,,, | Performed by: NURSE PRACTITIONER

## 2023-03-21 PROCEDURE — 3074F PR MOST RECENT SYSTOLIC BLOOD PRESSURE < 130 MM HG: ICD-10-PCS | Mod: ,,, | Performed by: NURSE PRACTITIONER

## 2023-03-21 PROCEDURE — 83036 HEMOGLOBIN A1C: ICD-10-PCS | Mod: GZ,,, | Performed by: CLINICAL MEDICAL LABORATORY

## 2023-03-21 PROCEDURE — 99213 PR OFFICE/OUTPT VISIT, EST, LEVL III, 20-29 MIN: ICD-10-PCS | Mod: 25,,, | Performed by: NURSE PRACTITIONER

## 2023-03-21 PROCEDURE — 3044F HG A1C LEVEL LT 7.0%: CPT | Mod: ,,, | Performed by: NURSE PRACTITIONER

## 2023-03-21 PROCEDURE — 3008F PR BODY MASS INDEX (BMI) DOCUMENTED: ICD-10-PCS | Mod: ,,, | Performed by: NURSE PRACTITIONER

## 2023-03-21 PROCEDURE — 1125F AMNT PAIN NOTED PAIN PRSNT: CPT | Mod: ,,, | Performed by: NURSE PRACTITIONER

## 2023-03-21 PROCEDURE — 3044F PR MOST RECENT HEMOGLOBIN A1C LEVEL <7.0%: ICD-10-PCS | Mod: ,,, | Performed by: NURSE PRACTITIONER

## 2023-03-21 PROCEDURE — 1159F PR MEDICATION LIST DOCUMENTED IN MEDICAL RECORD: ICD-10-PCS | Mod: ,,, | Performed by: NURSE PRACTITIONER

## 2023-03-21 PROCEDURE — 3074F SYST BP LT 130 MM HG: CPT | Mod: ,,, | Performed by: NURSE PRACTITIONER

## 2023-03-21 PROCEDURE — 80053 COMPREHEN METABOLIC PANEL: CPT | Mod: ,,, | Performed by: CLINICAL MEDICAL LABORATORY

## 2023-03-21 PROCEDURE — 96372 PR INJECTION,THERAP/PROPH/DIAG2ST, IM OR SUBCUT: ICD-10-PCS | Mod: ,,, | Performed by: NURSE PRACTITIONER

## 2023-03-21 RX ORDER — NAPROXEN 500 MG/1
500 TABLET ORAL 2 TIMES DAILY PRN
Qty: 30 TABLET | Refills: 2 | Status: SHIPPED | OUTPATIENT
Start: 2023-03-21 | End: 2023-06-22 | Stop reason: SDUPTHER

## 2023-03-21 RX ORDER — DEXAMETHASONE SODIUM PHOSPHATE 4 MG/ML
4 INJECTION, SOLUTION INTRA-ARTICULAR; INTRALESIONAL; INTRAMUSCULAR; INTRAVENOUS; SOFT TISSUE
Status: COMPLETED | OUTPATIENT
Start: 2023-03-21 | End: 2023-03-21

## 2023-03-21 RX ORDER — KETOROLAC TROMETHAMINE 30 MG/ML
30 INJECTION, SOLUTION INTRAMUSCULAR; INTRAVENOUS
Status: COMPLETED | OUTPATIENT
Start: 2023-03-21 | End: 2023-03-21

## 2023-03-21 RX ADMIN — KETOROLAC TROMETHAMINE 30 MG: 30 INJECTION, SOLUTION INTRAMUSCULAR; INTRAVENOUS at 03:03

## 2023-03-21 RX ADMIN — DEXAMETHASONE SODIUM PHOSPHATE 4 MG: 4 INJECTION, SOLUTION INTRA-ARTICULAR; INTRALESIONAL; INTRAMUSCULAR; INTRAVENOUS; SOFT TISSUE at 03:03

## 2023-03-21 NOTE — PROGRESS NOTES
"[facility] Medicine    Chief Complaint      Chief Complaint   Patient presents with    Medication Refill     Requesting refill Rxs on routine medications and     Referral     Requesting referral for pain treatment to see Dr Barnhart for pain mgmt for RA      History of Present Illness      Jeff Starr is a 72 y.o. male with chronic conditions of COPD, hypertension, and chronic pain syndrome who presents today for medication refills.  He is also requesting a referral to Dr. Asencio with pain management. He was previously under the care of Dr. Browne for pain management but has not seen him in about 3 years. He states "Dr. Soliman has been helping me with me pain".    Past Medical History:  Past Medical History:   Diagnosis Date    COVID     HTN (hypertension)      Past Surgical History:   has a past surgical history that includes Shoulder surgery (Right).    Social History:  Social History     Tobacco Use    Smoking status: Every Day     Packs/day: 0.50     Years: 50.00     Pack years: 25.00     Types: Cigarettes     Start date: 1970    Smokeless tobacco: Never   Substance Use Topics    Alcohol use: Not Currently    Drug use: Yes     Types: Marijuana     Comment: occasional per pain     I personally reviewed all past medical, surgical, and social.     Review of Systems   Musculoskeletal:  Positive for back pain (lumbar) and joint pain (right shoulder, left wrist/hand).   Neurological:  Positive for tingling (ring and pinky finger of right hand numb/stiff).      Medications:  Outpatient Encounter Medications as of 3/21/2023   Medication Sig Dispense Refill    albuterol (PROVENTIL/VENTOLIN HFA) 90 mcg/actuation inhaler Inhale 1 puff into the lungs every 4 (four) hours as needed for Shortness of Breath. 18 g 3    amLODIPine (NORVASC) 10 MG tablet Take 1 tablet (10 mg total) by mouth once daily. 90 tablet 1    LIDOcaine (LMX) 4 % cream Apply topically as needed.      loratadine (CLARITIN) 10 mg tablet Take 10 mg by " mouth once daily.      [DISCONTINUED] naproxen (NAPROSYN) 500 MG tablet Take 1 tablet (500 mg total) by mouth 2 (two) times daily. 20 tablet 2    [DISCONTINUED] naproxen (NAPROSYN) 500 MG tablet Take 1 tablet (500 mg total) by mouth 2 (two) times daily as needed (pain). 30 tablet 2    [DISCONTINUED] oxymetazoline (AFRIN) 0.05 % nasal spray 2 sprays by Nasal route 2 (two) times daily.      naproxen (NAPROSYN) 500 MG tablet Take 1 tablet (500 mg total) by mouth 2 (two) times daily as needed (pain). 30 tablet 2    [DISCONTINUED] amoxicillin (AMOXIL) 500 MG capsule Take 500 mg by mouth every 8 (eight) hours.      [DISCONTINUED] azithromycin (Z-ALF) 250 MG tablet TAKE TWO TABS PO ON DAY 1, THEN TAKE ONE TAB A DAY FOR 4 DAYS (Patient not taking: Reported on 2022) 6 tablet 0    [DISCONTINUED] benzonatate (TESSALON) 100 MG capsule One to two capsules three times daily as needed for cough (Patient not taking: Reported on 3/21/2023) 30 capsule 0    [DISCONTINUED] CHLORPHENIRAMINE MALEATE,BULK, MISC 4 mg by Misc.(Non-Drug; Combo Route) route.      [DISCONTINUED] dexAMETHasone (DECADRON) 1 MG Tab SMARTSI Tablet(s) By Mouth Every 12 Hours      [DISCONTINUED] gabapentin (NEURONTIN) 300 MG capsule Take 1 capsule (300 mg total) by mouth every evening. (Patient not taking: Reported on 2022) 90 capsule 0    [DISCONTINUED] neomycin-polymyxin-dexamethasone (DEXACINE) 3.5 mg/g-10,000 unit/g-0.1 % Oint       [DISCONTINUED] promethazine-dextromethorphan (PROMETHAZINE-DM) 6.25-15 mg/5 mL Syrp Take 5 mLs by mouth every 4 (four) hours as needed (cough). (Patient not taking: Reported on 3/21/2023) 240 mL 0     Facility-Administered Encounter Medications as of 3/21/2023   Medication Dose Route Frequency Provider Last Rate Last Admin    dexAMETHasone injection 4 mg  4 mg Intramuscular 1 time in Clinic/HOD Ivett Ascencio FNP        ketorolac injection 30 mg  30 mg Intramuscular 1 time in Clinic/HOD SHELLY PeterP      "    Allergies:  Review of patient's allergies indicates:  No Known Allergies    Health Maintenance:  Immunization History   Administered Date(s) Administered    COVID-19 MRNA, LN-S PF (MODERNA HALF 0.25 ML DOSE) 11/04/2021, 06/16/2022    COVID-19, MRNA, LN-S, PF (MODERNA FULL 0.5 ML DOSE) 01/20/2021, 02/26/2021    Influenza (FLUAD) - Quadrivalent - Adjuvanted - PF *Preferred* (65+) 11/16/2022    Pneumococcal Conjugate - 20 Valent 11/16/2022    Tdap 07/13/2015      Health Maintenance   Topic Date Due    LDCT Lung Screen  Never done    Hepatitis C Screening  10/16/2023 (Originally 1950)    TETANUS VACCINE  07/13/2025    Lipid Panel  04/14/2027    Abdominal Aortic Aneurysm Screening  Completed        Physical Exam      Vital Signs  Temp: 98.2 °F (36.8 °C)  Pulse: 82  Resp: 18  SpO2: 97 %  BP: 128/84  Pain Score:   4  Pain Loc: Generalized  Height and Weight  Height: 5' 4" (162.6 cm)  Weight: 68 kg (150 lb)  BSA (Calculated - sq m): 1.75 sq meters  BMI (Calculated): 25.7  Weight in (lb) to have BMI = 25: 145.3]    Physical Exam  Vitals reviewed.   Constitutional:       Appearance: Normal appearance.   HENT:      Head: Normocephalic.   Eyes:      Conjunctiva/sclera: Conjunctivae normal.   Cardiovascular:      Rate and Rhythm: Normal rate and regular rhythm.      Heart sounds: Normal heart sounds. No murmur heard.  Musculoskeletal:      Cervical back: Normal range of motion.   Skin:     General: Skin is warm.   Neurological:      Mental Status: He is alert and oriented to person, place, and time.   Psychiatric:         Mood and Affect: Mood normal.         Behavior: Behavior normal.         Thought Content: Thought content normal.         Judgment: Judgment normal.        Laboratory:  CBC:  Recent Labs   Lab 06/03/21  0839 04/14/22  0854   WBC 5.19 5.39   RBC 4.12 L 4.01 L   Hemoglobin 13.3 L 13.1 L   Hematocrit 40.1 40.2   Platelet Count 143 L 144 L   MCV 97.3 H 100.2 H   MCH 32.3 H 32.7 H   MCHC 33.2 32.6 "     CMP:  Recent Labs   Lab 10/18/22  1020   Glucose 83   Calcium 8.5   Albumin 3.8   Sodium 140   Potassium 4.2   CO2 28   Chloride 110 H   BUN 12     LIPIDS:  Recent Labs   Lab 06/03/21  0839 04/14/22  0854   HDL Cholesterol 38 L 38 L   Cholesterol 148 151   Triglycerides 70 79   LDL Calculated 96 97   Cholesterol/HDL Ratio (Risk Factor) 3.9 4.0   Non- 113       Assessment/Plan     Problem List Items Addressed This Visit          Cardiac/Vascular    Hypertension - Primary    Relevant Orders    Comprehensive Metabolic Panel       Orthopedic    Osteoarthritis of multiple joints    Relevant Medications    naproxen (NAPROSYN) 500 MG tablet    ketorolac injection 30 mg (Start on 3/21/2023  3:30 PM)    dexAMETHasone injection 4 mg (Start on 3/21/2023  3:30 PM)    Other Relevant Orders    Ambulatory referral/consult to Pain Clinic     Other Visit Diagnoses       Screening for diabetes mellitus        Relevant Orders    Hemoglobin A1C    Chronic bilateral low back pain without sciatica        Relevant Medications    ketorolac injection 30 mg (Start on 3/21/2023  3:30 PM)    dexAMETHasone injection 4 mg (Start on 3/21/2023  3:30 PM)    Other Relevant Orders    Ambulatory referral/consult to Pain Clinic         Jeff Starr is a 72 y.o.male with:    1. Osteoarthritis of multiple joints, unspecified osteoarthritis type  - naproxen (NAPROSYN) 500 MG tablet; Take 1 tablet (500 mg total) by mouth 2 (two) times daily as needed (pain).  Dispense: 30 tablet; Refill: 2  - Ambulatory referral/consult to Pain Clinic; Future  - ketorolac injection 30 mg  - dexAMETHasone injection 4 mg    2. Primary hypertension  - Comprehensive Metabolic Panel; Future    3. Screening for diabetes mellitus  - Hemoglobin A1C; Future    4. Chronic bilateral low back pain without sciatica  - Ambulatory referral/consult to Pain Clinic; Future  - ketorolac injection 30 mg  - dexAMETHasone injection 4 mg     Chronic conditions status updated as  per HPI.  Other than changes above, cont current medications and maintain follow up with specialists.  Return to clinic in 3 months.     SKY Peter  [facility]

## 2023-03-23 ENCOUNTER — TELEPHONE (OUTPATIENT)
Dept: FAMILY MEDICINE | Facility: CLINIC | Age: 73
End: 2023-03-23
Payer: COMMERCIAL

## 2023-03-23 NOTE — TELEPHONE ENCOUNTER
Spoke with Mr Matty, voiced understanding of lab findings and information from provider, no other concerns voiced @ this time

## 2023-04-08 PROBLEM — G89.4 CHRONIC PAIN SYNDROME: Chronic | Status: ACTIVE | Noted: 2023-04-08

## 2023-06-09 DIAGNOSIS — Z71.89 COMPLEX CARE COORDINATION: ICD-10-CM

## 2023-06-22 ENCOUNTER — OFFICE VISIT (OUTPATIENT)
Dept: FAMILY MEDICINE | Facility: CLINIC | Age: 73
End: 2023-06-22
Payer: COMMERCIAL

## 2023-06-22 VITALS
HEART RATE: 61 BPM | DIASTOLIC BLOOD PRESSURE: 73 MMHG | WEIGHT: 150 LBS | SYSTOLIC BLOOD PRESSURE: 132 MMHG | OXYGEN SATURATION: 97 % | HEIGHT: 64 IN | RESPIRATION RATE: 18 BRPM | TEMPERATURE: 98 F | BODY MASS INDEX: 25.61 KG/M2

## 2023-06-22 DIAGNOSIS — I10 HYPERTENSION, UNSPECIFIED TYPE: Primary | ICD-10-CM

## 2023-06-22 DIAGNOSIS — J44.9 CHRONIC OBSTRUCTIVE PULMONARY DISEASE, UNSPECIFIED COPD TYPE: ICD-10-CM

## 2023-06-22 DIAGNOSIS — M15.9 OSTEOARTHRITIS OF MULTIPLE JOINTS, UNSPECIFIED OSTEOARTHRITIS TYPE: ICD-10-CM

## 2023-06-22 PROCEDURE — 3044F PR MOST RECENT HEMOGLOBIN A1C LEVEL <7.0%: ICD-10-PCS | Mod: ,,, | Performed by: NURSE PRACTITIONER

## 2023-06-22 PROCEDURE — 3044F HG A1C LEVEL LT 7.0%: CPT | Mod: ,,, | Performed by: NURSE PRACTITIONER

## 2023-06-22 PROCEDURE — 3008F PR BODY MASS INDEX (BMI) DOCUMENTED: ICD-10-PCS | Mod: ,,, | Performed by: NURSE PRACTITIONER

## 2023-06-22 PROCEDURE — 3075F SYST BP GE 130 - 139MM HG: CPT | Mod: ,,, | Performed by: NURSE PRACTITIONER

## 2023-06-22 PROCEDURE — 3008F BODY MASS INDEX DOCD: CPT | Mod: ,,, | Performed by: NURSE PRACTITIONER

## 2023-06-22 PROCEDURE — 1160F RVW MEDS BY RX/DR IN RCRD: CPT | Mod: ,,, | Performed by: NURSE PRACTITIONER

## 2023-06-22 PROCEDURE — 3078F PR MOST RECENT DIASTOLIC BLOOD PRESSURE < 80 MM HG: ICD-10-PCS | Mod: ,,, | Performed by: NURSE PRACTITIONER

## 2023-06-22 PROCEDURE — 3075F PR MOST RECENT SYSTOLIC BLOOD PRESS GE 130-139MM HG: ICD-10-PCS | Mod: ,,, | Performed by: NURSE PRACTITIONER

## 2023-06-22 PROCEDURE — 1159F MED LIST DOCD IN RCRD: CPT | Mod: ,,, | Performed by: NURSE PRACTITIONER

## 2023-06-22 PROCEDURE — 1125F PR PAIN SEVERITY QUANTIFIED, PAIN PRESENT: ICD-10-PCS | Mod: ,,, | Performed by: NURSE PRACTITIONER

## 2023-06-22 PROCEDURE — 99213 OFFICE O/P EST LOW 20 MIN: CPT | Mod: ,,, | Performed by: NURSE PRACTITIONER

## 2023-06-22 PROCEDURE — 1160F PR REVIEW ALL MEDS BY PRESCRIBER/CLIN PHARMACIST DOCUMENTED: ICD-10-PCS | Mod: ,,, | Performed by: NURSE PRACTITIONER

## 2023-06-22 PROCEDURE — 1125F AMNT PAIN NOTED PAIN PRSNT: CPT | Mod: ,,, | Performed by: NURSE PRACTITIONER

## 2023-06-22 PROCEDURE — 1159F PR MEDICATION LIST DOCUMENTED IN MEDICAL RECORD: ICD-10-PCS | Mod: ,,, | Performed by: NURSE PRACTITIONER

## 2023-06-22 PROCEDURE — 3078F DIAST BP <80 MM HG: CPT | Mod: ,,, | Performed by: NURSE PRACTITIONER

## 2023-06-22 PROCEDURE — 99213 PR OFFICE/OUTPT VISIT, EST, LEVL III, 20-29 MIN: ICD-10-PCS | Mod: ,,, | Performed by: NURSE PRACTITIONER

## 2023-06-22 RX ORDER — DEXTROMETHORPHAN HYDROBROMIDE, GUAIFENESIN 5; 100 MG/5ML; MG/5ML
LIQUID ORAL
COMMUNITY
Start: 2023-05-02

## 2023-06-22 RX ORDER — AMLODIPINE BESYLATE 10 MG/1
10 TABLET ORAL DAILY
Qty: 90 TABLET | Refills: 1 | Status: SHIPPED | OUTPATIENT
Start: 2023-06-22 | End: 2023-12-12 | Stop reason: SDUPTHER

## 2023-06-22 RX ORDER — ALBUTEROL SULFATE 90 UG/1
1 AEROSOL, METERED RESPIRATORY (INHALATION) EVERY 4 HOURS PRN
Qty: 18 G | Refills: 3 | Status: SHIPPED | OUTPATIENT
Start: 2023-06-22 | End: 2023-12-12 | Stop reason: SDUPTHER

## 2023-06-22 RX ORDER — NAPROXEN 500 MG/1
500 TABLET ORAL 2 TIMES DAILY PRN
Qty: 60 TABLET | Refills: 2 | Status: SHIPPED | OUTPATIENT
Start: 2023-06-22 | End: 2023-12-12 | Stop reason: SDUPTHER

## 2023-06-22 RX ORDER — LIDOCAINE 40 MG/G
CREAM TOPICAL
Qty: 28 G | Refills: 2 | Status: SHIPPED | OUTPATIENT
Start: 2023-06-22

## 2023-06-22 NOTE — PROGRESS NOTES
SKY Peter   [facility]  [address] [place] [address]     PATIENT NAME: Jeff Starr  : 1950  DATE: 23  MRN: [id number]      Billing Provider: SKY Peter  Level of Service:   Patient PCP Information       Provider PCP Type    SKY Peter General        Reason for Visit / Chief Complaint: Follow-up (3 month F/U and fasting from food consumption this am only coffee )       Update PCP  Update Chief Complaint        History of Present Illness / Problem Focused Workflow   RS is a 72 y/o BM with a PMH of chronic pain syndrome and hypertension who presents today for three-month follow-up and medication refills.  He has an appointment with Dr. Murcia 2023 to establish care for pain management.      Review of Systems     Review of Systems   Respiratory:  Negative for cough, chest tightness and shortness of breath.    Cardiovascular:  Negative for chest pain, palpitations and leg swelling.   Musculoskeletal:  Positive for arthralgias (chronic) and back pain (chronic).   Neurological:  Negative for dizziness and headaches.     Medical / Social / Family History     Past Medical History:   Diagnosis Date    Chronic pain syndrome 2023    COVID     HTN (hypertension)      Past Surgical History:   Procedure Laterality Date    SHOULDER SURGERY Right      Social History    reports that he has been smoking cigarettes. He started smoking about 52 years ago. He has a 25.00 pack-year smoking history. He has never used smokeless tobacco. He reports that he does not currently use alcohol. He reports current drug use. Drug: Marijuana.    Family History  's family history includes Coronary artery disease in his father; Diabetes in his father; Heart attack in his father; Hypertension in his father and mother.    Medications and Allergies     Medications  Outpatient Medications Marked as Taking for the 23 encounter (Office Visit) with Ivett MORALES  SKY Ascencio   Medication Sig Dispense Refill    acetaminophen (TYLENOL) 650 MG TbSR Tylenol Arthritis Pain      loratadine (CLARITIN) 10 mg tablet Take 10 mg by mouth once daily.      [DISCONTINUED] albuterol (PROVENTIL/VENTOLIN HFA) 90 mcg/actuation inhaler Inhale 1 puff into the lungs every 4 (four) hours as needed for Shortness of Breath. 18 g 3    [DISCONTINUED] amLODIPine (NORVASC) 10 MG tablet Take 1 tablet (10 mg total) by mouth once daily. 90 tablet 1    [DISCONTINUED] LIDOcaine (LMX) 4 % cream Apply topically as needed.      [DISCONTINUED] naproxen (NAPROSYN) 500 MG tablet Take 1 tablet (500 mg total) by mouth 2 (two) times daily as needed (pain). 30 tablet 2       Allergies  Review of patient's allergies indicates:  No Known Allergies    Physical Examination     Vitals:    06/22/23 0805   BP: 132/73   Pulse: 61   Resp: 18   Temp: 97.8 °F (36.6 °C)       Physical Exam  Vitals reviewed.   Constitutional:       Appearance: Normal appearance.   Cardiovascular:      Rate and Rhythm: Normal rate and regular rhythm.   Pulmonary:      Effort: Pulmonary effort is normal.      Breath sounds: Normal breath sounds.   Neurological:      Mental Status: He is alert and oriented to person, place, and time.   Psychiatric:         Mood and Affect: Mood normal.         Behavior: Behavior normal.         Thought Content: Thought content normal.         Judgment: Judgment normal.          Imaging / Labs     Office Visit on 03/21/2023   Component Date Value Ref Range Status    Sodium 03/21/2023 142  136 - 145 mmol/L Final    Potassium 03/21/2023 4.0  3.5 - 5.1 mmol/L Final    Chloride 03/21/2023 111 (H)  98 - 107 mmol/L Final    CO2 03/21/2023 28  21 - 32 mmol/L Final    Anion Gap 03/21/2023 7  7 - 16 mmol/L Final    Glucose 03/21/2023 74  74 - 106 mg/dL Final    BUN 03/21/2023 10  7 - 18 mg/dL Final    Creatinine 03/21/2023 0.94  0.70 - 1.30 mg/dL Final    BUN/Creatinine Ratio 03/21/2023 11  6 - 20 Final    Calcium 03/21/2023  9.3  8.5 - 10.1 mg/dL Final    Total Protein 03/21/2023 7.7  6.4 - 8.2 g/dL Final    Albumin 03/21/2023 3.9  3.5 - 5.0 g/dL Final    Globulin 03/21/2023 3.8  2.0 - 4.0 g/dL Final    A/G Ratio 03/21/2023 1.0   Final    Bilirubin, Total 03/21/2023 0.9  >0.0 - 1.2 mg/dL Final    Alk Phos 03/21/2023 85  45 - 115 U/L Final    ALT 03/21/2023 45  16 - 61 U/L Final    AST 03/21/2023 41 (H)  15 - 37 U/L Final    eGFR 03/21/2023 87  >=60 mL/min/1.73m² Final    Hemoglobin A1C 03/21/2023 5.1  4.5 - 6.6 % Final      Normal:               <5.7%  Pre-Diabetic:       5.7% to 6.4%  Diabetic:             >6.4%  Diabetic Goal:     <7%    Estimated Average Glucose 03/21/2023 84  mg/dL Final      Assessment and Plan (including Health Maintenance)      Problem List  Smart Sets  Document Outside HM   :    Health Maintenance Due   Topic Date Due    Colorectal Cancer Screening  Never done    LDCT Lung Screen  Never done     Problem List Items Addressed This Visit          Pulmonary    Chronic obstructive pulmonary disease (Chronic)    Overview     Albuterol 1 puff every 4 hours as needed         Current Assessment & Plan     Chronic, controlled   Continue current medications and treatment plan         Relevant Medications    albuterol (PROVENTIL/VENTOLIN HFA) 90 mcg/actuation inhaler       Cardiac/Vascular    Hypertension    Overview     Goal BP less than 140/80   Amlodipine 10 mg oral daily         Current Assessment & Plan     BP Readings from Last 3 Encounters:   06/22/23 132/73   03/21/23 128/84   11/16/22 134/80   Chronic, controlled   Continue current medications and treatment plan         Relevant Medications    amLODIPine (NORVASC) 10 MG tablet       Orthopedic    Osteoarthritis of multiple joints (Chronic)    Overview     Acetaminophen 650 mg every 6 hours as needed   Naproxen 500 mg b.i.d. as needed   Lidocaine 4% cream to painful joints as directed         Current Assessment & Plan     Chronic, not controlled   Scheduled to see  Dr. Murcia 07/14/2023         Relevant Medications    naproxen (NAPROSYN) 500 MG tablet    LIDOcaine (LMX) 4 % cream       Health Maintenance Topics with due status: Not Due       Topic Last Completion Date    TETANUS VACCINE 07/13/2015    Lipid Panel 04/14/2022       Future Appointments   Date Time Provider Department Center   11/3/2023  8:00 AM AWV NURSE [facility]   12/12/2023  8:20 AM SKY Peter [facility]      Signature:      SKY Gray  Family Nurse Practitioner  [facility]    Date of encounter: 6/22/23

## 2023-06-23 NOTE — ASSESSMENT & PLAN NOTE
BP Readings from Last 3 Encounters:   06/22/23 132/73   03/21/23 128/84   11/16/22 134/80     Chronic, controlled   Continue current medications and treatment plan

## 2023-11-02 NOTE — PROGRESS NOTES
"Jeff Starr presented for a  Medicare AWV and comprehensive Health Risk Assessment today. The following components were reviewed and updated:    Medical history  Family History  Social history  Allergies and Current Medications  Health Risk Assessment  Health Maintenance  Care Team     ** See Completed Assessments for Annual Wellness Visit within the encounter summary.**    The following assessments were completed:  Living Situation  CAGE  Depression Screening  Timed Get Up and Go  Whisper Test  Cognitive Function Screening  Nutrition Screening  ADL Screening  PAQ Screening          Vitals:    11/03/23 0827   BP: 129/74   BP Location: Left arm   Patient Position: Sitting   BP Method: Large (Manual)   Pulse: 72   Resp: 14   Temp: 98.8 °F (37.1 °C)   TempSrc: Oral   SpO2: 98%   Weight: 65.8 kg (145 lb)   Height: 5' 4" (1.626 m)     Body mass index is 24.89 kg/m².    Physical Exam  Vitals reviewed.   Constitutional:       Appearance: Normal appearance.   Cardiovascular:      Rate and Rhythm: Normal rate and regular rhythm.   Pulmonary:      Effort: Pulmonary effort is normal.      Breath sounds: Normal breath sounds.   Neurological:      Mental Status: He is alert and oriented to person, place, and time.   Psychiatric:         Mood and Affect: Mood normal.         Behavior: Behavior normal.         Thought Content: Thought content normal.         Judgment: Judgment normal.       Diagnoses and health risks identified today and associated recommendations/orders:    Problem List Items Addressed This Visit          Pulmonary    Chronic obstructive pulmonary disease (Chronic)     Chronic, controlled  Continue current medications and treatment plan            Cardiac/Vascular    Hypertension     BP Readings from Last 3 Encounters:   11/03/23 129/74   06/22/23 132/73   03/21/23 128/84   Chronic, controlled  Continue current medications and treatment plan            Immunology/Multi System    Rheumatoid arthritis     Chronic, " controlled  Continue current medications and treatment plan            Endocrine    Body mass index (BMI) of 24.0 to 24.9 in adult       Orthopedic    Osteoarthritis of multiple joints (Chronic)     Chronic, controlled  Continue current medications and treatment plan          Other Visit Diagnoses       Encounter for subsequent annual wellness visit (AWV) in Medicare patient    -  Primary    Screening for malignant neoplasm of colon        Relevant Orders    Colonoscopy          Provided Jeff with a 5-10 year written screening schedule and personal prevention plan. Recommendations were developed using the USPSTF age appropriate recommendations. Education, counseling, and referrals were provided as needed. After Visit Summary printed and given to patient which includes a list of additional screenings\tests needed.    Request be lab be drawn at later time lipid, psa, hepatitis  Decline lung cancer screening  and shingle vaccine   Raz for covid and flu vaccine report [facility] pharmacy     Assistance with smoking cessation was offered, including:  [x]  Medications  [x]  Counseling  [x]  Printed Information on Smoking Cessation  []  Referral to a Smoking Cessation Program    Patient was counseled regarding smoking for 3-10 minutes.     Follow up in about 54 weeks (around 11/15/2024) for in 1 year for annual wellness visit at 8:00 a.m. .    SKY Peter

## 2023-11-03 ENCOUNTER — OFFICE VISIT (OUTPATIENT)
Dept: FAMILY MEDICINE | Facility: CLINIC | Age: 73
End: 2023-11-03
Payer: COMMERCIAL

## 2023-11-03 VITALS
TEMPERATURE: 99 F | RESPIRATION RATE: 14 BRPM | HEART RATE: 72 BPM | OXYGEN SATURATION: 98 % | SYSTOLIC BLOOD PRESSURE: 129 MMHG | WEIGHT: 145 LBS | HEIGHT: 64 IN | DIASTOLIC BLOOD PRESSURE: 74 MMHG | BODY MASS INDEX: 24.75 KG/M2

## 2023-11-03 DIAGNOSIS — I10 HYPERTENSION, UNSPECIFIED TYPE: ICD-10-CM

## 2023-11-03 DIAGNOSIS — J44.9 CHRONIC OBSTRUCTIVE PULMONARY DISEASE, UNSPECIFIED COPD TYPE: ICD-10-CM

## 2023-11-03 DIAGNOSIS — M15.9 OSTEOARTHRITIS OF MULTIPLE JOINTS, UNSPECIFIED OSTEOARTHRITIS TYPE: ICD-10-CM

## 2023-11-03 DIAGNOSIS — Z12.11 SCREENING FOR MALIGNANT NEOPLASM OF COLON: ICD-10-CM

## 2023-11-03 DIAGNOSIS — M06.9 RHEUMATOID ARTHRITIS, INVOLVING UNSPECIFIED SITE, UNSPECIFIED WHETHER RHEUMATOID FACTOR PRESENT: ICD-10-CM

## 2023-11-03 DIAGNOSIS — Z00.00 ENCOUNTER FOR SUBSEQUENT ANNUAL WELLNESS VISIT (AWV) IN MEDICARE PATIENT: Primary | ICD-10-CM

## 2023-11-03 PROCEDURE — 1159F PR MEDICATION LIST DOCUMENTED IN MEDICAL RECORD: ICD-10-PCS | Mod: ,,, | Performed by: NURSE PRACTITIONER

## 2023-11-03 PROCEDURE — 1101F PT FALLS ASSESS-DOCD LE1/YR: CPT | Mod: ,,, | Performed by: NURSE PRACTITIONER

## 2023-11-03 PROCEDURE — 1159F MED LIST DOCD IN RCRD: CPT | Mod: ,,, | Performed by: NURSE PRACTITIONER

## 2023-11-03 PROCEDURE — 1125F PR PAIN SEVERITY QUANTIFIED, PAIN PRESENT: ICD-10-PCS | Mod: ,,, | Performed by: NURSE PRACTITIONER

## 2023-11-03 PROCEDURE — 3074F PR MOST RECENT SYSTOLIC BLOOD PRESSURE < 130 MM HG: ICD-10-PCS | Mod: ,,, | Performed by: NURSE PRACTITIONER

## 2023-11-03 PROCEDURE — 3288F PR FALLS RISK ASSESSMENT DOCUMENTED: ICD-10-PCS | Mod: ,,, | Performed by: NURSE PRACTITIONER

## 2023-11-03 PROCEDURE — G0439 PPPS, SUBSEQ VISIT: HCPCS | Mod: ,,, | Performed by: NURSE PRACTITIONER

## 2023-11-03 PROCEDURE — 3288F FALL RISK ASSESSMENT DOCD: CPT | Mod: ,,, | Performed by: NURSE PRACTITIONER

## 2023-11-03 PROCEDURE — 3044F PR MOST RECENT HEMOGLOBIN A1C LEVEL <7.0%: ICD-10-PCS | Mod: ,,, | Performed by: NURSE PRACTITIONER

## 2023-11-03 PROCEDURE — 1101F PR PT FALLS ASSESS DOC 0-1 FALLS W/OUT INJ PAST YR: ICD-10-PCS | Mod: ,,, | Performed by: NURSE PRACTITIONER

## 2023-11-03 PROCEDURE — 3074F SYST BP LT 130 MM HG: CPT | Mod: ,,, | Performed by: NURSE PRACTITIONER

## 2023-11-03 PROCEDURE — G0439 PR MEDICARE ANNUAL WELLNESS SUBSEQUENT VISIT: ICD-10-PCS | Mod: ,,, | Performed by: NURSE PRACTITIONER

## 2023-11-03 PROCEDURE — 3078F PR MOST RECENT DIASTOLIC BLOOD PRESSURE < 80 MM HG: ICD-10-PCS | Mod: ,,, | Performed by: NURSE PRACTITIONER

## 2023-11-03 PROCEDURE — 3044F HG A1C LEVEL LT 7.0%: CPT | Mod: ,,, | Performed by: NURSE PRACTITIONER

## 2023-11-03 PROCEDURE — 1125F AMNT PAIN NOTED PAIN PRSNT: CPT | Mod: ,,, | Performed by: NURSE PRACTITIONER

## 2023-11-03 PROCEDURE — 3078F DIAST BP <80 MM HG: CPT | Mod: ,,, | Performed by: NURSE PRACTITIONER

## 2023-11-03 NOTE — PATIENT INSTRUCTIONS
Counseling and Referral of Other Preventative  (Italic type indicates deductible and co-insurance are waived)    Patient Name: Jeff Starr  Today's Date: 11/3/2023    Health Maintenance         Date Due Completion Date    Hepatitis C Screening Never done ---    Colorectal Cancer Screening Never done ---    LDCT Lung Screen Never done ---    RSV Vaccine (Age 60+) (1 - 1-dose 60+ series) Never done ---    Influenza Vaccine (1) 09/01/2023 11/16/2022    COVID-19 Vaccine (5 - 2023-24 season) 09/01/2023 6/16/2022    Shingles Vaccine (1 of 2) 11/16/2023 (Originally 5/16/2001) ---    TETANUS VACCINE 07/13/2025 7/13/2015    Lipid Panel 04/14/2027 4/14/2022                The following information is provided to all patients.  This information is to help you find resources for any of the problems found today that may be affecting your health:                Living healthy guide: www.[facility].[place].[facility]      Understanding Diabetes: www.diabetes.org      Eating healthy: www.cdc.gov/healthyweight      CDC home safety checklist: www.cdc.gov/steadi/patient.html      Agency on Aging: www.goea.[place].gov      Alcoholics anonymous (AA): www.aa.org      Physical Activity: www.sharan.nih.gov/iw0rxkb      Tobacco use: www.quitwithusla.org

## 2023-11-03 NOTE — ASSESSMENT & PLAN NOTE
BP Readings from Last 3 Encounters:   11/03/23 129/74   06/22/23 132/73   03/21/23 128/84     Chronic, controlled  Continue current medications and treatment plan

## 2023-11-06 DIAGNOSIS — Z12.11 SCREENING FOR COLON CANCER: Primary | ICD-10-CM

## 2023-11-06 RX ORDER — POLYETHYLENE GLYCOL 3350, SODIUM SULFATE ANHYDROUS, SODIUM BICARBONATE, SODIUM CHLORIDE, POTASSIUM CHLORIDE 236; 22.74; 6.74; 5.86; 2.97 G/4L; G/4L; G/4L; G/4L; G/4L
4 POWDER, FOR SOLUTION ORAL ONCE
Qty: 4000 ML | Refills: 0 | Status: SHIPPED | OUTPATIENT
Start: 2023-11-06 | End: 2023-11-06

## 2023-11-28 ENCOUNTER — NEW REFERRAL (OUTPATIENT)
Dept: URBAN - METROPOLITAN AREA CLINIC 105 | Facility: CLINIC | Age: 73
End: 2023-11-28

## 2023-11-28 VITALS — SYSTOLIC BLOOD PRESSURE: 148 MMHG | DIASTOLIC BLOOD PRESSURE: 93 MMHG

## 2023-11-28 DIAGNOSIS — H25.13: ICD-10-CM

## 2023-11-28 DIAGNOSIS — H35.3132: ICD-10-CM

## 2023-11-28 DIAGNOSIS — H35.371: ICD-10-CM

## 2023-11-28 DIAGNOSIS — H43.813: ICD-10-CM

## 2023-11-28 PROCEDURE — 92202 OPSCPY EXTND ON/MAC DRAW: CPT

## 2023-11-28 PROCEDURE — 92134 CPTRZ OPH DX IMG PST SGM RTA: CPT

## 2023-11-28 PROCEDURE — 99204 OFFICE O/P NEW MOD 45 MIN: CPT

## 2023-11-28 ASSESSMENT — VISUAL ACUITY
OS_CC: 20/100+1
OS_SC: 20/30-1
OD_SC: 20/80+1
OD_CC: 20/400

## 2023-11-28 ASSESSMENT — TONOMETRY
OS_IOP_MMHG: 14
OD_IOP_MMHG: 17

## 2023-12-12 ENCOUNTER — OFFICE VISIT (OUTPATIENT)
Dept: FAMILY MEDICINE | Facility: CLINIC | Age: 73
End: 2023-12-12
Payer: COMMERCIAL

## 2023-12-12 VITALS
WEIGHT: 145 LBS | SYSTOLIC BLOOD PRESSURE: 148 MMHG | DIASTOLIC BLOOD PRESSURE: 75 MMHG | BODY MASS INDEX: 24.75 KG/M2 | OXYGEN SATURATION: 98 % | HEART RATE: 72 BPM | HEIGHT: 64 IN

## 2023-12-12 DIAGNOSIS — F17.200 TOBACCO DEPENDENCE: ICD-10-CM

## 2023-12-12 DIAGNOSIS — M15.9 OSTEOARTHRITIS OF MULTIPLE JOINTS, UNSPECIFIED OSTEOARTHRITIS TYPE: ICD-10-CM

## 2023-12-12 DIAGNOSIS — J44.9 CHRONIC OBSTRUCTIVE PULMONARY DISEASE, UNSPECIFIED COPD TYPE: Primary | ICD-10-CM

## 2023-12-12 DIAGNOSIS — I10 HYPERTENSION, UNSPECIFIED TYPE: ICD-10-CM

## 2023-12-12 LAB
ALBUMIN SERPL BCP-MCNC: 3.6 G/DL (ref 3.5–5)
ALBUMIN/GLOB SERPL: 0.9 {RATIO}
ALP SERPL-CCNC: 91 U/L (ref 45–115)
ALT SERPL W P-5'-P-CCNC: 47 U/L (ref 16–61)
ANION GAP SERPL CALCULATED.3IONS-SCNC: 7 MMOL/L (ref 7–16)
AST SERPL W P-5'-P-CCNC: 43 U/L (ref 15–37)
BILIRUB SERPL-MCNC: 0.9 MG/DL (ref ?–1.2)
BUN SERPL-MCNC: 9 MG/DL (ref 7–18)
BUN/CREAT SERPL: 10 (ref 6–20)
CALCIUM SERPL-MCNC: 9.2 MG/DL (ref 8.5–10.1)
CHLORIDE SERPL-SCNC: 109 MMOL/L (ref 98–107)
CHOLEST SERPL-MCNC: 146 MG/DL (ref 0–200)
CHOLEST/HDLC SERPL: 3.3 {RATIO}
CO2 SERPL-SCNC: 29 MMOL/L (ref 21–32)
CREAT SERPL-MCNC: 0.94 MG/DL (ref 0.7–1.3)
EGFR (NO RACE VARIABLE) (RUSH/TITUS): 86 ML/MIN/1.73M2
GLOBULIN SER-MCNC: 4 G/DL (ref 2–4)
GLUCOSE SERPL-MCNC: 93 MG/DL (ref 74–106)
HDLC SERPL-MCNC: 44 MG/DL (ref 40–60)
LDLC SERPL CALC-MCNC: 89 MG/DL
LDLC/HDLC SERPL: 2 {RATIO}
NONHDLC SERPL-MCNC: 102 MG/DL
POTASSIUM SERPL-SCNC: 4 MMOL/L (ref 3.5–5.1)
PROT SERPL-MCNC: 7.6 G/DL (ref 6.4–8.2)
SODIUM SERPL-SCNC: 141 MMOL/L (ref 136–145)
TRIGL SERPL-MCNC: 66 MG/DL (ref 35–150)
VLDLC SERPL-MCNC: 13 MG/DL

## 2023-12-12 PROCEDURE — 3077F SYST BP >= 140 MM HG: CPT | Mod: ,,, | Performed by: NURSE PRACTITIONER

## 2023-12-12 PROCEDURE — 80053 COMPREHEN METABOLIC PANEL: CPT | Mod: ,,, | Performed by: CLINICAL MEDICAL LABORATORY

## 2023-12-12 PROCEDURE — 3008F BODY MASS INDEX DOCD: CPT | Mod: ,,, | Performed by: NURSE PRACTITIONER

## 2023-12-12 PROCEDURE — 1101F PT FALLS ASSESS-DOCD LE1/YR: CPT | Mod: ,,, | Performed by: NURSE PRACTITIONER

## 2023-12-12 PROCEDURE — 3288F PR FALLS RISK ASSESSMENT DOCUMENTED: ICD-10-PCS | Mod: ,,, | Performed by: NURSE PRACTITIONER

## 2023-12-12 PROCEDURE — 1159F MED LIST DOCD IN RCRD: CPT | Mod: ,,, | Performed by: NURSE PRACTITIONER

## 2023-12-12 PROCEDURE — 80061 LIPID PANEL: CPT | Mod: ,,, | Performed by: CLINICAL MEDICAL LABORATORY

## 2023-12-12 PROCEDURE — 80053 COMPREHENSIVE METABOLIC PANEL: ICD-10-PCS | Mod: ,,, | Performed by: CLINICAL MEDICAL LABORATORY

## 2023-12-12 PROCEDURE — 1159F PR MEDICATION LIST DOCUMENTED IN MEDICAL RECORD: ICD-10-PCS | Mod: ,,, | Performed by: NURSE PRACTITIONER

## 2023-12-12 PROCEDURE — 99213 OFFICE O/P EST LOW 20 MIN: CPT | Mod: ,,, | Performed by: NURSE PRACTITIONER

## 2023-12-12 PROCEDURE — 3044F HG A1C LEVEL LT 7.0%: CPT | Mod: ,,, | Performed by: NURSE PRACTITIONER

## 2023-12-12 PROCEDURE — 99213 PR OFFICE/OUTPT VISIT, EST, LEVL III, 20-29 MIN: ICD-10-PCS | Mod: ,,, | Performed by: NURSE PRACTITIONER

## 2023-12-12 PROCEDURE — 3078F DIAST BP <80 MM HG: CPT | Mod: ,,, | Performed by: NURSE PRACTITIONER

## 2023-12-12 PROCEDURE — 80061 LIPID PANEL: ICD-10-PCS | Mod: ,,, | Performed by: CLINICAL MEDICAL LABORATORY

## 2023-12-12 PROCEDURE — 3078F PR MOST RECENT DIASTOLIC BLOOD PRESSURE < 80 MM HG: ICD-10-PCS | Mod: ,,, | Performed by: NURSE PRACTITIONER

## 2023-12-12 PROCEDURE — 1101F PR PT FALLS ASSESS DOC 0-1 FALLS W/OUT INJ PAST YR: ICD-10-PCS | Mod: ,,, | Performed by: NURSE PRACTITIONER

## 2023-12-12 PROCEDURE — 3044F PR MOST RECENT HEMOGLOBIN A1C LEVEL <7.0%: ICD-10-PCS | Mod: ,,, | Performed by: NURSE PRACTITIONER

## 2023-12-12 PROCEDURE — 3288F FALL RISK ASSESSMENT DOCD: CPT | Mod: ,,, | Performed by: NURSE PRACTITIONER

## 2023-12-12 PROCEDURE — 3077F PR MOST RECENT SYSTOLIC BLOOD PRESSURE >= 140 MM HG: ICD-10-PCS | Mod: ,,, | Performed by: NURSE PRACTITIONER

## 2023-12-12 PROCEDURE — 3008F PR BODY MASS INDEX (BMI) DOCUMENTED: ICD-10-PCS | Mod: ,,, | Performed by: NURSE PRACTITIONER

## 2023-12-12 RX ORDER — ALBUTEROL SULFATE 90 UG/1
1 AEROSOL, METERED RESPIRATORY (INHALATION) EVERY 4 HOURS PRN
Qty: 18 G | Refills: 3 | Status: SHIPPED | OUTPATIENT
Start: 2023-12-12 | End: 2024-03-20 | Stop reason: SDUPTHER

## 2023-12-12 RX ORDER — AMLODIPINE BESYLATE 10 MG/1
10 TABLET ORAL DAILY
Qty: 90 TABLET | Refills: 0 | Status: SHIPPED | OUTPATIENT
Start: 2023-12-12 | End: 2024-03-20 | Stop reason: SDUPTHER

## 2023-12-12 RX ORDER — NAPROXEN 500 MG/1
500 TABLET ORAL 2 TIMES DAILY PRN
Qty: 60 TABLET | Refills: 2 | Status: SHIPPED | OUTPATIENT
Start: 2023-12-12 | End: 2024-03-20 | Stop reason: SDUPTHER

## 2023-12-12 NOTE — PROGRESS NOTES
SKY Peter   [place] [address] [address] [facility] [address] [place] [address]     PATIENT NAME: Jeff Starr  : 1950  DATE: 23  MRN: [id number]      Billing Provider: SKY Peter  Level of Service:   Patient PCP Information       Provider PCP Type    SKY Peter General          Reason for Visit / Chief Complaint: Follow-up (6m fu ) and Medication Refill (Albuterol )       Update PCP  Update Chief Complaint        History of Present Illness / Problem Focused Workflow   RS is a 72 y/o BM with a PMH of chronic pain syndrome, hypertension, and COPD who presents today for 6 month follow up. He is also requesting a refill for his Albuterol inhaler at this time.       Review of Systems     Review of Systems   Constitutional:  Negative for chills, fatigue and fever.   HENT:  Negative for hearing loss.    Respiratory:  Negative for cough and shortness of breath.    Cardiovascular:  Negative for chest pain and leg swelling.   Gastrointestinal:  Negative for abdominal pain.   Musculoskeletal:  Negative for gait problem.   Skin:  Negative for rash and wound.   Neurological:  Negative for dizziness and headaches.     Medical / Social / Family History     Past Medical History:   Diagnosis Date    Chronic pain syndrome 2023    COPD (chronic obstructive pulmonary disease)     COVID     HTN (hypertension)     Localized chronic periodontitis 2023    Tobacco user 2023     Past Surgical History:   Procedure Laterality Date    HERNIA REPAIR      SHOULDER SURGERY Right      Social History  Social History     Socioeconomic History    Marital status:    Tobacco Use    Smoking status: Every Day     Current packs/day: 0.50     Average packs/day: 0.5 packs/day for 53.0 years (26.5 ttl pk-yrs)     Types: Cigarettes     Start date:      Passive exposure: Current    Smokeless tobacco: Never   Substance and Sexual Activity    Alcohol use: Not Currently    Drug use:  Yes     Types: Marijuana     Comment: occasional per pain    Sexual activity: Yes     Social Determinants of Health     Financial Resource Strain: Low Risk  (11/3/2023)    Overall Financial Resource Strain (CARDIA)     Difficulty of Paying Living Expenses: Not hard at all   Food Insecurity: No Food Insecurity (11/3/2023)    Hunger Vital Sign     Worried About Running Out of Food in the Last Year: Never true     Ran Out of Food in the Last Year: Never true   Transportation Needs: No Transportation Needs (11/3/2023)    PRAPARE - Transportation     Lack of Transportation (Medical): No     Lack of Transportation (Non-Medical): No   Physical Activity: Inactive (11/3/2023)    Exercise Vital Sign     Days of Exercise per Week: 0 days     Minutes of Exercise per Session: 0 min   Stress: No Stress Concern Present (11/3/2023)    [employer] - Occupational Stress Questionnaire     Feeling of Stress : Not at all   Social Connections: Moderately Integrated (11/3/2023)    Social Connection and Isolation Panel [NHANES]     Frequency of Communication with Friends and Family: More than three times a week     Frequency of Social Gatherings with Friends and Family: More than three times a week     Attends Zoroastrianism Services: More than 4 times per year     Active Member of Clubs or Organizations: Yes     Attends Club or Organization Meetings: More than 4 times per year     Marital Status:    Housing Stability: Low Risk  (11/3/2023)    Housing Stability Vital Sign     Unable to Pay for Housing in the Last Year: No     Number of Places Lived in the Last Year: 1     Unstable Housing in the Last Year: No      Medications and Allergies     Medications  Outpatient Medications Marked as Taking for the 12/12/23 encounter (Office Visit) with Ivett Ascencio FNP   Medication Sig Dispense Refill    acetaminophen (TYLENOL) 650 MG TbSR Tylenol Arthritis Pain      LIDOcaine (LMX) 4 % cream Apply topically as  needed (shoulder). 28 g 2    loratadine (CLARITIN) 10 mg tablet Take 10 mg by mouth once daily.      [DISCONTINUED] albuterol (PROVENTIL/VENTOLIN HFA) 90 mcg/actuation inhaler Inhale 1 puff into the lungs every 4 (four) hours as needed for Shortness of Breath. 18 g 3    [DISCONTINUED] amLODIPine (NORVASC) 10 MG tablet Take 1 tablet (10 mg total) by mouth once daily. 90 tablet 1    [DISCONTINUED] naproxen (NAPROSYN) 500 MG tablet Take 1 tablet (500 mg total) by mouth 2 (two) times daily as needed (pain). 60 tablet 2     Allergies  Review of patient's allergies indicates:  No Known Allergies    Physical Examination     Vitals:    12/12/23 1521   BP: (!) 148/75   Pulse:      Physical Exam  Vitals reviewed.   Constitutional:       Appearance: Normal appearance.   Cardiovascular:      Rate and Rhythm: Normal rate and regular rhythm.   Pulmonary:      Effort: Pulmonary effort is normal.      Breath sounds: Normal breath sounds.   Neurological:      Mental Status: He is alert and oriented to person, place, and time.   Psychiatric:         Mood and Affect: Mood normal.         Behavior: Behavior normal.         Thought Content: Thought content normal.         Judgment: Judgment normal.        Labs     Lab Results   Component Value Date     12/12/2023    K 4.0 12/12/2023     (H) 12/12/2023    CO2 29 12/12/2023    BUN 9 12/12/2023    CREATININE 0.94 12/12/2023    CALCIUM 9.2 12/12/2023    ANIONGAP 7 12/12/2023    EGFRNORACEVR 86 12/12/2023       Lab Results   Component Value Date    HGBA1C 5.1 03/21/2023      Lab Results   Component Value Date    MICROALBUR 1.7 10/18/2022      Lab Results   Component Value Date    WBC 5.39 04/14/2022    HGB 13.1 (L) 04/14/2022    HCT 40.2 04/14/2022    .2 (H) 04/14/2022     (L) 04/14/2022     Assessment and Plan (including Health Maintenance)      Problem List  Smart Sets  Document Outside HM   :  Health Maintenance Due   Topic Date Due    Hepatitis C Screening   Never done    Colorectal Cancer Screening  Never done    LDCT Lung Screen  Never done    Shingles Vaccine (1 of 2) Never done    RSV Vaccine (Age 60+ and Pregnant patients) (1 - 1-dose 60+ series) Never done    COVID-19 Vaccine (5 - 2023-24 season) 09/01/2023     Problem List Items Addressed This Visit          Pulmonary    Chronic obstructive pulmonary disease - Primary (Chronic)    Overview     Albuterol 1 puff every 4 hours as needed         Current Assessment & Plan     Chronic, controlled  Continue current medications and treatment plan          Relevant Medications    albuterol (PROVENTIL/VENTOLIN HFA) 90 mcg/actuation inhaler       Cardiac/Vascular    Hypertension    Overview     Goal BP less than 140/80   Amlodipine 10 mg oral daily         Current Assessment & Plan     BP Readings from Last 3 Encounters:   12/12/23 (!) 148/75   11/03/23 129/74   06/22/23 132/73      Chronic, controlled  Continue current medications and treatment plan          Relevant Medications    amLODIPine (NORVASC) 10 MG tablet    Other Relevant Orders    Lipid Panel (Completed)    Comprehensive Metabolic Panel (Completed)       Orthopedic    Osteoarthritis of multiple joints (Chronic)    Overview     Acetaminophen 650 mg every 6 hours as needed   Naproxen 500 mg b.i.d. as needed   Lidocaine 4% cream to painful joints as directed         Current Assessment & Plan     Chronic, controlled  Continue current medications and treatment plan          Relevant Medications    naproxen (NAPROSYN) 500 MG tablet       Other    Tobacco dependence    Current Assessment & Plan     -LDCT for lung cancer screening ordered  -Discussed smoking cessation and options for assistance with quitting smoking  -Patient does not wish to quit smoking at this time  -Written educational materials provided for smoking cessation          Relevant Orders    CT Chest Lung Screening Low Dose   -Discussed recommendations for LDCT for lung cancer screenind and patient  agrees.        Health Maintenance Topics with due status: Not Due       Topic Last Completion Date    TETANUS VACCINE 07/13/2015    Lipid Panel 12/12/2023     Future Appointments   Date Time Provider Department Center   3/13/2024 11:00 AM Ivett Ascencio FNP [facility]   5/9/2024  8:00 AM [facility] GI ROOM 01 [facility]   11/15/2024  8:00 AM AWV NURSE [facility]      Signature:    SKY Gray  Family Nurse Practitioner  [facility]    Date of encounter: 12/12/23

## 2023-12-16 PROBLEM — K05.319: Status: RESOLVED | Noted: 2023-03-21 | Resolved: 2023-12-16

## 2023-12-16 PROBLEM — Z72.0 TOBACCO USER: Status: RESOLVED | Noted: 2023-03-21 | Resolved: 2023-12-16

## 2023-12-16 NOTE — ASSESSMENT & PLAN NOTE
BP Readings from Last 3 Encounters:   12/12/23 (!) 148/75   11/03/23 129/74   06/22/23 132/73      Chronic, controlled  Continue current medications and treatment plan

## 2023-12-16 NOTE — ASSESSMENT & PLAN NOTE
-LDCT for lung cancer screening ordered  -Discussed smoking cessation and options for assistance with quitting smoking  -Patient does not wish to quit smoking at this time  -Written educational materials provided for smoking cessation

## 2023-12-18 ENCOUNTER — SURGERY/PROCEDURE (OUTPATIENT)
Dept: URBAN - METROPOLITAN AREA HOSPITAL 8 | Facility: HOSPITAL | Age: 73
End: 2023-12-18

## 2023-12-18 DIAGNOSIS — H35.371: ICD-10-CM

## 2023-12-18 PROCEDURE — 67041 VIT FOR MACULAR PUCKER: CPT | Mod: 82,RT

## 2023-12-19 ENCOUNTER — 1 DAY POST-OP (OUTPATIENT)
Dept: URBAN - METROPOLITAN AREA CLINIC 8 | Facility: CLINIC | Age: 73
End: 2023-12-19

## 2023-12-19 ENCOUNTER — TELEPHONE (OUTPATIENT)
Dept: FAMILY MEDICINE | Facility: CLINIC | Age: 73
End: 2023-12-19
Payer: COMMERCIAL

## 2023-12-19 DIAGNOSIS — H35.371: ICD-10-CM

## 2023-12-19 DIAGNOSIS — H25.13: ICD-10-CM

## 2023-12-19 DIAGNOSIS — H35.3132: ICD-10-CM

## 2023-12-19 DIAGNOSIS — Z98.890: ICD-10-CM

## 2023-12-19 DIAGNOSIS — H43.813: ICD-10-CM

## 2023-12-19 PROCEDURE — 92202 OPSCPY EXTND ON/MAC DRAW: CPT | Mod: NC

## 2023-12-19 PROCEDURE — 99024 POSTOP FOLLOW-UP VISIT: CPT

## 2023-12-19 ASSESSMENT — TONOMETRY
OS_IOP_MMHG: 13
OD_IOP_MMHG: 22

## 2023-12-19 ASSESSMENT — VISUAL ACUITY
OS_SC: 20/40
OD_SC: 20/300

## 2023-12-27 ENCOUNTER — TELEPHONE (OUTPATIENT)
Dept: FAMILY MEDICINE | Facility: CLINIC | Age: 73
End: 2023-12-27
Payer: COMMERCIAL

## 2023-12-27 NOTE — TELEPHONE ENCOUNTER
----- Message from SKY Peter sent at 12/16/2023  2:26 PM CST -----  Please contact the patient and let them know that their results were fine and do not require any change in treatment.

## 2023-12-28 ENCOUNTER — POST-OP CHECK (OUTPATIENT)
Dept: URBAN - METROPOLITAN AREA CLINIC 105 | Facility: CLINIC | Age: 73
End: 2023-12-28

## 2023-12-28 DIAGNOSIS — H35.3132: ICD-10-CM

## 2023-12-28 DIAGNOSIS — H35.371: ICD-10-CM

## 2023-12-28 DIAGNOSIS — Z98.890: ICD-10-CM

## 2023-12-28 PROCEDURE — 92134 CPTRZ OPH DX IMG PST SGM RTA: CPT

## 2023-12-28 PROCEDURE — 99024 POSTOP FOLLOW-UP VISIT: CPT | Mod: 24

## 2023-12-28 ASSESSMENT — TONOMETRY
OD_IOP_MMHG: 19
OS_IOP_MMHG: 15

## 2023-12-28 ASSESSMENT — VISUAL ACUITY
OS_SC: 20/25-2
OD_SC: 20/80+2

## 2024-01-09 DIAGNOSIS — Z71.89 COMPLEX CARE COORDINATION: ICD-10-CM

## 2024-01-10 ENCOUNTER — OFFICE VISIT (OUTPATIENT)
Dept: FAMILY MEDICINE | Facility: CLINIC | Age: 74
End: 2024-01-10
Payer: COMMERCIAL

## 2024-01-10 VITALS
HEART RATE: 72 BPM | DIASTOLIC BLOOD PRESSURE: 54 MMHG | RESPIRATION RATE: 18 BRPM | WEIGHT: 147 LBS | TEMPERATURE: 98 F | SYSTOLIC BLOOD PRESSURE: 162 MMHG | OXYGEN SATURATION: 96 % | BODY MASS INDEX: 25.1 KG/M2 | HEIGHT: 64 IN

## 2024-01-10 DIAGNOSIS — J44.1 COPD EXACERBATION: Primary | ICD-10-CM

## 2024-01-10 DIAGNOSIS — R06.02 SOB (SHORTNESS OF BREATH): ICD-10-CM

## 2024-01-10 DIAGNOSIS — Z20.822 CONTACT WITH AND (SUSPECTED) EXPOSURE TO COVID-19: ICD-10-CM

## 2024-01-10 LAB
CTP QC/QA: YES
CTP QC/QA: YES
FLUAV AG NPH QL: NEGATIVE
FLUBV AG NPH QL: NEGATIVE
SARS-COV-2 RDRP RESP QL NAA+PROBE: NEGATIVE

## 2024-01-10 PROCEDURE — 3078F DIAST BP <80 MM HG: CPT | Mod: ,,, | Performed by: STUDENT IN AN ORGANIZED HEALTH CARE EDUCATION/TRAINING PROGRAM

## 2024-01-10 PROCEDURE — 3008F BODY MASS INDEX DOCD: CPT | Mod: ,,, | Performed by: STUDENT IN AN ORGANIZED HEALTH CARE EDUCATION/TRAINING PROGRAM

## 2024-01-10 PROCEDURE — 1101F PT FALLS ASSESS-DOCD LE1/YR: CPT | Mod: ,,, | Performed by: STUDENT IN AN ORGANIZED HEALTH CARE EDUCATION/TRAINING PROGRAM

## 2024-01-10 PROCEDURE — 1159F MED LIST DOCD IN RCRD: CPT | Mod: ,,, | Performed by: STUDENT IN AN ORGANIZED HEALTH CARE EDUCATION/TRAINING PROGRAM

## 2024-01-10 PROCEDURE — 3288F FALL RISK ASSESSMENT DOCD: CPT | Mod: ,,, | Performed by: STUDENT IN AN ORGANIZED HEALTH CARE EDUCATION/TRAINING PROGRAM

## 2024-01-10 PROCEDURE — 3077F SYST BP >= 140 MM HG: CPT | Mod: ,,, | Performed by: STUDENT IN AN ORGANIZED HEALTH CARE EDUCATION/TRAINING PROGRAM

## 2024-01-10 PROCEDURE — 99214 OFFICE O/P EST MOD 30 MIN: CPT | Mod: 25,,, | Performed by: STUDENT IN AN ORGANIZED HEALTH CARE EDUCATION/TRAINING PROGRAM

## 2024-01-10 PROCEDURE — 87635 SARS-COV-2 COVID-19 AMP PRB: CPT | Mod: QW,,, | Performed by: STUDENT IN AN ORGANIZED HEALTH CARE EDUCATION/TRAINING PROGRAM

## 2024-01-10 PROCEDURE — 96372 THER/PROPH/DIAG INJ SC/IM: CPT | Mod: ,,, | Performed by: STUDENT IN AN ORGANIZED HEALTH CARE EDUCATION/TRAINING PROGRAM

## 2024-01-10 PROCEDURE — 87804 INFLUENZA ASSAY W/OPTIC: CPT | Mod: 59,QW,, | Performed by: STUDENT IN AN ORGANIZED HEALTH CARE EDUCATION/TRAINING PROGRAM

## 2024-01-10 RX ORDER — CEFTRIAXONE 1 G/1
1 INJECTION, POWDER, FOR SOLUTION INTRAMUSCULAR; INTRAVENOUS
Status: COMPLETED | OUTPATIENT
Start: 2024-01-10 | End: 2024-01-10

## 2024-01-10 RX ORDER — PREDNISONE 20 MG/1
40 TABLET ORAL DAILY
Qty: 10 TABLET | Refills: 0 | Status: SHIPPED | OUTPATIENT
Start: 2024-01-10 | End: 2024-01-15

## 2024-01-10 RX ORDER — AZITHROMYCIN 250 MG/1
TABLET, FILM COATED ORAL
Qty: 6 TABLET | Refills: 0 | Status: SHIPPED | OUTPATIENT
Start: 2024-01-10 | End: 2024-01-15

## 2024-01-10 RX ORDER — BENZONATATE 100 MG/1
100 CAPSULE ORAL 3 TIMES DAILY PRN
Qty: 30 CAPSULE | Refills: 0 | Status: SHIPPED | OUTPATIENT
Start: 2024-01-10 | End: 2024-01-20

## 2024-01-10 RX ORDER — IPRATROPIUM BROMIDE 21 UG/1
2 SPRAY, METERED NASAL 2 TIMES DAILY
Qty: 30 ML | Refills: 0 | Status: SHIPPED | OUTPATIENT
Start: 2024-01-10

## 2024-01-10 RX ADMIN — CEFTRIAXONE 1 G: 1 INJECTION, POWDER, FOR SOLUTION INTRAMUSCULAR; INTRAVENOUS at 08:01

## 2024-01-10 NOTE — PATIENT INSTRUCTIONS
If you smoke, try to quit. Your doctor or nurse can help you with this.  Stay away from smoke-filled places. Avoid other things that may cause breathing problems like fumes, pollution, dust, and other common allergens.  If you have an inhaler to take when you are feeling short of breath, be sure to carry it with you all the time. Then, you can take it when needed. Take all your other medicines as directed.  The doctor may have ordered antibiotics to treat an infection. It is important to take all of your antibiotics even if you start to feel better.  Work to get as healthy as possible.  Be active at home by:  Walking. Ask your doctor how far you can walk and how often you should walk.  Exercising your arms, shoulders, and legs. Ask your doctor or therapist about what exercises are best for you.  Do breathing exercises 2 to 3 times each day.  Wear a mask when you are around dust or pollution.  Protect yourself from getting sick.  Wash your hands often.  Ask visitors with a cold to wear a mask or reschedule their visit.  Save your energy.  Place the things you use within easy reach where you do not have to bend over or stretch to get them.  Use a cart with wheels to move things around your house.  Avoid heavy activities unless your doctor tells you it is OK.  Use oxygen if you need it. Your doctor may give you oxygen to use at home. You will be taught how to use the oxygen at home by the staff that brings it to your home. Follow your doctor's advice on using it.  Never change the amount of oxygen flowing without talking to your doctor.  Always have a back-up oxygen supply at home or when you go out.  No candles, matches, cigarettes, or open flame should ever come near your oxygen.  Never smoke when using oxygen - this can cause severe burns to your face, mouth, throat, and lungs.

## 2024-01-10 NOTE — PROGRESS NOTES
[facility]    Chief Complaint      Chief Complaint   Patient presents with    Nasal Congestion     Post nasal drip    Generalized Body Aches    Cough     Productive cough (white in color)    Fatigue    Sore Throat     ST just from cough    Documentation Only     Symps started Sunday night into Monday morning. No otc meds in use; have been exp to covid/flu.    Shortness of Breath       History of Present Illness      Jeff Starr is a 73 y.o. male with chronic conditions of copd, tobacco dependence, htn,  who presents today for URI symptoms.  Did not take bp meds this AM.     Cough  Associated symptoms include myalgias, postnasal drip, a sore throat and shortness of breath. Pertinent negatives include no chest pain or wheezing.   Fatigue  Associated symptoms include congestion, coughing (productive), fatigue, myalgias and a sore throat. Pertinent negatives include no chest pain.   Sore Throat   Associated symptoms include congestion, coughing (productive) and shortness of breath.   Shortness of Breath  Associated symptoms include a sore throat. Pertinent negatives include no chest pain or wheezing.       Past Medical History:  Past Medical History:   Diagnosis Date    Chronic pain syndrome 04/08/2023    COPD (chronic obstructive pulmonary disease)     COVID     HTN (hypertension)     Localized chronic periodontitis 03/21/2023    Tobacco user 03/21/2023       Past Surgical History:   has a past surgical history that includes Shoulder surgery (Right) and Hernia repair.    Social History:  Social History     Tobacco Use    Smoking status: Every Day     Current packs/day: 0.50     Average packs/day: 0.5 packs/day for 53.0 years (26.5 ttl pk-yrs)     Types: Cigarettes     Start date: 1971     Passive exposure: Current    Smokeless tobacco: Never   Substance Use Topics    Alcohol use: Not Currently    Drug use: Yes     Types: Marijuana     Comment: occasional per pain       I personally reviewed all past  medical, surgical, and social.     Review of Systems   Constitutional:  Positive for fatigue.   HENT:  Positive for nasal congestion, postnasal drip and sore throat.    Respiratory:  Positive for cough (productive) and shortness of breath. Negative for wheezing.    Cardiovascular:  Negative for chest pain.   Musculoskeletal:  Positive for myalgias.        Medications:  Outpatient Encounter Medications as of 1/10/2024   Medication Sig Dispense Refill    acetaminophen (TYLENOL) 650 MG TbSR Tylenol Arthritis Pain      albuterol (PROVENTIL/VENTOLIN HFA) 90 mcg/actuation inhaler Inhale 1 puff into the lungs every 4 (four) hours as needed for Shortness of Breath. 18 g 3    amLODIPine (NORVASC) 10 MG tablet Take 1 tablet (10 mg total) by mouth once daily. 90 tablet 0    LIDOcaine (LMX) 4 % cream Apply topically as needed (shoulder). 28 g 2    loratadine (CLARITIN) 10 mg tablet Take 10 mg by mouth once daily.      naproxen (NAPROSYN) 500 MG tablet Take 1 tablet (500 mg total) by mouth 2 (two) times daily as needed (pain). 60 tablet 2    azithromycin (Z-ALF) 250 MG tablet Take 2 tablets by mouth on day 1; Take 1 tablet by mouth on days 2-5 6 tablet 0    benzonatate (TESSALON) 100 MG capsule Take 1 capsule (100 mg total) by mouth 3 (three) times daily as needed for Cough. 30 capsule 0    ipratropium (ATROVENT) 21 mcg (0.03 %) nasal spray 2 sprays by Each Nostril route 2 (two) times daily. 30 mL 0    predniSONE (DELTASONE) 20 MG tablet Take 2 tablets (40 mg total) by mouth once daily. for 5 days 10 tablet 0     Facility-Administered Encounter Medications as of 1/10/2024   Medication Dose Route Frequency Provider Last Rate Last Admin    [COMPLETED] cefTRIAXone injection 1 g  1 g Intramuscular 1 time in Clinic/HOD Natali Costello FNP   1 g at 01/10/24 0840       Allergies:  Review of patient's allergies indicates:  No Known Allergies    Health Maintenance:  Immunization History   Administered Date(s) Administered    COVID-19  "MRNA, LN-S PF (MODERNA HALF 0.25 ML DOSE) 11/04/2021, 06/16/2022    COVID-19, MRNA, LN-S, PF (MODERNA FULL 0.5 ML DOSE) 01/20/2021, 02/26/2021    Influenza (FLUAD) - Quadrivalent - Adjuvanted - PF *Preferred* (65+) 11/16/2022    Pneumococcal Conjugate - 20 Valent 11/16/2022    Tdap 07/13/2015      Health Maintenance   Topic Date Due    Hepatitis C Screening  Never done    Colorectal Cancer Screening  Never done    LDCT Lung Screen  Never done    Shingles Vaccine (1 of 2) Never done    TETANUS VACCINE  07/13/2025    Lipid Panel  12/12/2028    Abdominal Aortic Aneurysm Screening  Completed        Physical Exam      Vital Signs  Temp: 98.4 °F (36.9 °C)  Temp Source: Oral  Pulse: 72  Resp: 18  SpO2: 96 %  BP: (!) 162/54  BP Location: Left arm  Patient Position: Sitting  Height and Weight  Height: 5' 4" (162.6 cm)  Weight: 66.7 kg (147 lb)  BSA (Calculated - sq m): 1.74 sq meters  BMI (Calculated): 25.2  Weight in (lb) to have BMI = 25: 145.3]    Physical Exam  Constitutional:       Appearance: He is ill-appearing.   HENT:      Nose: Congestion present.      Mouth/Throat:      Pharynx: No posterior oropharyngeal erythema.   Cardiovascular:      Rate and Rhythm: Normal rate.   Pulmonary:      Breath sounds: No wheezing.   Skin:     General: Skin is warm and dry.   Neurological:      General: No focal deficit present.      Mental Status: He is oriented to person, place, and time. Mental status is at baseline.          Laboratory:  CBC:  Recent Labs   Lab 06/03/21  0839 04/14/22  0854   WBC 5.19 5.39   RBC 4.12 L 4.01 L   Hemoglobin 13.3 L 13.1 L   Hematocrit 40.1 40.2   Platelet Count 143 L 144 L   MCV 97.3 H 100.2 H   MCH 32.3 H 32.7 H   MCHC 33.2 32.6     CMP:  Recent Labs   Lab 03/21/23  1531 12/12/23  1620   Glucose 74 93   Calcium 9.3 9.2   Albumin 3.9 3.6   Total Protein 7.7 7.6   Sodium 142 141   Potassium 4.0 4.0   CO2 28 29   Chloride 111 H 109 H   BUN 10 9   Alk Phos 85 91   ALT 45 47   AST 41 H 43 H   Bilirubin, " Total 0.9 0.9     LIPIDS:  Recent Labs   Lab 06/03/21  0839 04/14/22  0854 12/12/23  1620   HDL Cholesterol 38 L 38 L 44   Cholesterol 148 151 146   Triglycerides 70 79 66   LDL Calculated 96 97 89   Cholesterol/HDL Ratio (Risk Factor) 3.9 4.0 3.3   Non- 113 102     TSH:      A1C:  Recent Labs   Lab 03/21/23  1531   Hemoglobin A1C 5.1       Assessment/Plan     Jeff Starr is a 73 y.o.male with:    1. COPD exacerbation  -     azithromycin (Z-ALF) 250 MG tablet; Take 2 tablets by mouth on day 1; Take 1 tablet by mouth on days 2-5  Dispense: 6 tablet; Refill: 0  -     predniSONE (DELTASONE) 20 MG tablet; Take 2 tablets (40 mg total) by mouth once daily. for 5 days  Dispense: 10 tablet; Refill: 0  -     cefTRIAXone injection 1 g  -     benzonatate (TESSALON) 100 MG capsule; Take 1 capsule (100 mg total) by mouth 3 (three) times daily as needed for Cough.  Dispense: 30 capsule; Refill: 0  -     ipratropium (ATROVENT) 21 mcg (0.03 %) nasal spray; 2 sprays by Each Nostril route 2 (two) times daily.  Dispense: 30 mL; Refill: 0    2. SOB (shortness of breath)  -     X-Ray Chest PA And Lateral; Future; Expected date: 01/10/2024    3. Contact with and (suspected) exposure to covid-19  -     POCT COVID-19 Rapid Screening  -     POCT Influenza A/B         Chronic conditions status updated as per HPI.  Other than changes above, cont current medications and maintain follow up with specialists.  Return to clinic as previously scheduled with pcp.     Patient Instructions   If you smoke, try to quit. Your doctor or nurse can help you with this.  Stay away from smoke-filled places. Avoid other things that may cause breathing problems like fumes, pollution, dust, and other common allergens.  If you have an inhaler to take when you are feeling short of breath, be sure to carry it with you all the time. Then, you can take it when needed. Take all your other medicines as directed.  The doctor may have ordered antibiotics to  treat an infection. It is important to take all of your antibiotics even if you start to feel better.  Work to get as healthy as possible.  Be active at home by:  Walking. Ask your doctor how far you can walk and how often you should walk.  Exercising your arms, shoulders, and legs. Ask your doctor or therapist about what exercises are best for you.  Do breathing exercises 2 to 3 times each day.  Wear a mask when you are around dust or pollution.  Protect yourself from getting sick.  Wash your hands often.  Ask visitors with a cold to wear a mask or reschedule their visit.  Save your energy.  Place the things you use within easy reach where you do not have to bend over or stretch to get them.  Use a cart with wheels to move things around your house.  Avoid heavy activities unless your doctor tells you it is OK.  Use oxygen if you need it. Your doctor may give you oxygen to use at home. You will be taught how to use the oxygen at home by the staff that brings it to your home. Follow your doctor's advice on using it.  Never change the amount of oxygen flowing without talking to your doctor.  Always have a back-up oxygen supply at home or when you go out.  No candles, matches, cigarettes, or open flame should ever come near your oxygen.  Never smoke when using oxygen - this can cause severe burns to your face, mouth, throat, and lungs.       Natali Costello, [facility]

## 2024-01-25 ENCOUNTER — POST-OP CHECK (OUTPATIENT)
Dept: URBAN - METROPOLITAN AREA CLINIC 105 | Facility: CLINIC | Age: 74
End: 2024-01-25

## 2024-01-25 DIAGNOSIS — Z98.890: ICD-10-CM

## 2024-01-25 DIAGNOSIS — H35.3132: ICD-10-CM

## 2024-01-25 PROCEDURE — 99024 POSTOP FOLLOW-UP VISIT: CPT

## 2024-01-25 ASSESSMENT — VISUAL ACUITY
OD_SC: 20/60-1
OS_SC: 20/30

## 2024-01-25 ASSESSMENT — TONOMETRY
OD_IOP_MMHG: 17
OD_IOP_MMHG: 13
OS_IOP_MMHG: 15

## 2024-02-06 ENCOUNTER — HOSPITAL ENCOUNTER (OUTPATIENT)
Dept: RADIOLOGY | Facility: HOSPITAL | Age: 74
Discharge: HOME | End: 2024-02-06
Attending: NURSE PRACTITIONER
Payer: COMMERCIAL

## 2024-02-06 VITALS — HEIGHT: 63 IN | WEIGHT: 145 LBS | BODY MASS INDEX: 25.69 KG/M2

## 2024-02-06 DIAGNOSIS — F17.200 TOBACCO DEPENDENCE: ICD-10-CM

## 2024-02-06 PROCEDURE — 71271 CT THORAX LUNG CANCER SCR C-: CPT | Mod: 26,,, | Performed by: STUDENT IN AN ORGANIZED HEALTH CARE EDUCATION/TRAINING PROGRAM

## 2024-02-06 PROCEDURE — 71271 CT THORAX LUNG CANCER SCR C-: CPT | Mod: TC

## 2024-03-20 ENCOUNTER — OFFICE VISIT (OUTPATIENT)
Dept: FAMILY MEDICINE | Facility: CLINIC | Age: 74
End: 2024-03-20
Payer: COMMERCIAL

## 2024-03-20 VITALS
HEIGHT: 63 IN | HEART RATE: 63 BPM | BODY MASS INDEX: 25.52 KG/M2 | OXYGEN SATURATION: 99 % | WEIGHT: 144 LBS | SYSTOLIC BLOOD PRESSURE: 115 MMHG | DIASTOLIC BLOOD PRESSURE: 72 MMHG

## 2024-03-20 DIAGNOSIS — J44.1 COPD EXACERBATION: ICD-10-CM

## 2024-03-20 DIAGNOSIS — I10 HYPERTENSION, UNSPECIFIED TYPE: ICD-10-CM

## 2024-03-20 DIAGNOSIS — J30.89 SEASONAL ALLERGIC RHINITIS DUE TO OTHER ALLERGIC TRIGGER: Primary | ICD-10-CM

## 2024-03-20 DIAGNOSIS — J44.9 CHRONIC OBSTRUCTIVE PULMONARY DISEASE, UNSPECIFIED COPD TYPE: ICD-10-CM

## 2024-03-20 DIAGNOSIS — M15.9 OSTEOARTHRITIS OF MULTIPLE JOINTS, UNSPECIFIED OSTEOARTHRITIS TYPE: ICD-10-CM

## 2024-03-20 DIAGNOSIS — Z11.59 ENCOUNTER FOR HEPATITIS C SCREENING TEST FOR LOW RISK PATIENT: ICD-10-CM

## 2024-03-20 LAB
ALBUMIN SERPL BCP-MCNC: 4 G/DL (ref 3.5–5)
ALBUMIN/GLOB SERPL: 1.2 {RATIO}
ALP SERPL-CCNC: 86 U/L (ref 45–115)
ALT SERPL W P-5'-P-CCNC: 50 U/L (ref 16–61)
ANION GAP SERPL CALCULATED.3IONS-SCNC: 7 MMOL/L (ref 7–16)
AST SERPL W P-5'-P-CCNC: 39 U/L (ref 15–37)
BILIRUB SERPL-MCNC: 1.2 MG/DL (ref ?–1.2)
BUN SERPL-MCNC: 10 MG/DL (ref 7–18)
BUN/CREAT SERPL: 11 (ref 6–20)
CALCIUM SERPL-MCNC: 9.2 MG/DL (ref 8.5–10.1)
CHLORIDE SERPL-SCNC: 111 MMOL/L (ref 98–107)
CO2 SERPL-SCNC: 28 MMOL/L (ref 21–32)
CREAT SERPL-MCNC: 0.93 MG/DL (ref 0.7–1.3)
EGFR (NO RACE VARIABLE) (RUSH/TITUS): 87 ML/MIN/1.73M2
GLOBULIN SER-MCNC: 3.4 G/DL (ref 2–4)
GLUCOSE SERPL-MCNC: 101 MG/DL (ref 74–106)
HCV AB SER QL: REACTIVE
POTASSIUM SERPL-SCNC: 3.9 MMOL/L (ref 3.5–5.1)
PROT SERPL-MCNC: 7.4 G/DL (ref 6.4–8.2)
SODIUM SERPL-SCNC: 142 MMOL/L (ref 136–145)

## 2024-03-20 PROCEDURE — 3078F DIAST BP <80 MM HG: CPT | Mod: ,,, | Performed by: FAMILY MEDICINE

## 2024-03-20 PROCEDURE — 3074F SYST BP LT 130 MM HG: CPT | Mod: ,,, | Performed by: FAMILY MEDICINE

## 2024-03-20 PROCEDURE — 87522 HEPATITIS C REVRS TRNSCRPJ: CPT | Mod: 90,,, | Performed by: CLINICAL MEDICAL LABORATORY

## 2024-03-20 PROCEDURE — 80053 COMPREHEN METABOLIC PANEL: CPT | Mod: ,,, | Performed by: CLINICAL MEDICAL LABORATORY

## 2024-03-20 PROCEDURE — 86803 HEPATITIS C AB TEST: CPT | Mod: ,,, | Performed by: CLINICAL MEDICAL LABORATORY

## 2024-03-20 PROCEDURE — 99214 OFFICE O/P EST MOD 30 MIN: CPT | Mod: ,,, | Performed by: FAMILY MEDICINE

## 2024-03-20 PROCEDURE — 3288F FALL RISK ASSESSMENT DOCD: CPT | Mod: ,,, | Performed by: FAMILY MEDICINE

## 2024-03-20 PROCEDURE — 1101F PT FALLS ASSESS-DOCD LE1/YR: CPT | Mod: ,,, | Performed by: FAMILY MEDICINE

## 2024-03-20 PROCEDURE — 3008F BODY MASS INDEX DOCD: CPT | Mod: ,,, | Performed by: FAMILY MEDICINE

## 2024-03-20 RX ORDER — ALBUTEROL SULFATE 90 UG/1
1 AEROSOL, METERED RESPIRATORY (INHALATION) EVERY 4 HOURS PRN
Qty: 18 G | Refills: 3 | Status: SHIPPED | OUTPATIENT
Start: 2024-03-20

## 2024-03-20 RX ORDER — NAPROXEN 500 MG/1
500 TABLET ORAL 2 TIMES DAILY PRN
Qty: 60 TABLET | Refills: 2 | Status: SHIPPED | OUTPATIENT
Start: 2024-03-20

## 2024-03-20 RX ORDER — IPRATROPIUM BROMIDE 21 UG/1
2 SPRAY, METERED NASAL 2 TIMES DAILY
Qty: 30 ML | Refills: 0 | Status: SHIPPED | OUTPATIENT
Start: 2024-03-20

## 2024-03-20 RX ORDER — AMLODIPINE BESYLATE 10 MG/1
10 TABLET ORAL DAILY
Qty: 90 TABLET | Refills: 0 | Status: SHIPPED | OUTPATIENT
Start: 2024-03-20

## 2024-03-20 RX ORDER — LORATADINE 10 MG/1
10 TABLET ORAL DAILY
Qty: 90 TABLET | Refills: 0 | Status: SHIPPED | OUTPATIENT
Start: 2024-03-20

## 2024-03-22 LAB — HCV RNA SERPL NAA+PROBE-ACNC: ABNORMAL IU/ML

## 2024-03-28 ENCOUNTER — TELEPHONE (OUTPATIENT)
Dept: FAMILY MEDICINE | Facility: CLINIC | Age: 74
End: 2024-03-28
Payer: COMMERCIAL

## 2024-03-28 NOTE — TELEPHONE ENCOUNTER
Notified patient that labs were abnormal. Patient voiced understanding. Schedule appt for 4/1/24 at 3:00 P.M.

## 2024-03-28 NOTE — TELEPHONE ENCOUNTER
----- Message from Karla Mead DO sent at 3/27/2024  5:34 PM CDT -----  Please have the patient return to the clinic to discuss lab results.

## 2024-04-01 ENCOUNTER — OFFICE VISIT (OUTPATIENT)
Dept: FAMILY MEDICINE | Facility: CLINIC | Age: 74
End: 2024-04-01
Payer: COMMERCIAL

## 2024-04-01 VITALS
WEIGHT: 146 LBS | SYSTOLIC BLOOD PRESSURE: 130 MMHG | DIASTOLIC BLOOD PRESSURE: 70 MMHG | HEART RATE: 68 BPM | BODY MASS INDEX: 25.87 KG/M2 | HEIGHT: 63 IN

## 2024-04-01 DIAGNOSIS — B19.20 HEPATITIS C TEST POSITIVE: Primary | ICD-10-CM

## 2024-04-01 LAB
HAV IGM SER QL: ABNORMAL
HBV CORE IGM SER QL: ABNORMAL
HBV SURFACE AG SERPL QL IA: ABNORMAL
HCV AB SER QL: REACTIVE

## 2024-04-01 PROCEDURE — 87522 HEPATITIS C REVRS TRNSCRPJ: CPT | Mod: 90,,, | Performed by: CLINICAL MEDICAL LABORATORY

## 2024-04-01 PROCEDURE — 3288F FALL RISK ASSESSMENT DOCD: CPT | Mod: ,,, | Performed by: FAMILY MEDICINE

## 2024-04-01 PROCEDURE — 1159F MED LIST DOCD IN RCRD: CPT | Mod: ,,, | Performed by: FAMILY MEDICINE

## 2024-04-01 PROCEDURE — 3075F SYST BP GE 130 - 139MM HG: CPT | Mod: ,,, | Performed by: FAMILY MEDICINE

## 2024-04-01 PROCEDURE — 3078F DIAST BP <80 MM HG: CPT | Mod: ,,, | Performed by: FAMILY MEDICINE

## 2024-04-01 PROCEDURE — 1101F PT FALLS ASSESS-DOCD LE1/YR: CPT | Mod: ,,, | Performed by: FAMILY MEDICINE

## 2024-04-01 PROCEDURE — 1160F RVW MEDS BY RX/DR IN RCRD: CPT | Mod: ,,, | Performed by: FAMILY MEDICINE

## 2024-04-01 PROCEDURE — 80074 ACUTE HEPATITIS PANEL: CPT | Mod: ,,, | Performed by: CLINICAL MEDICAL LABORATORY

## 2024-04-01 PROCEDURE — 3008F BODY MASS INDEX DOCD: CPT | Mod: ,,, | Performed by: FAMILY MEDICINE

## 2024-04-01 PROCEDURE — 99213 OFFICE O/P EST LOW 20 MIN: CPT | Mod: ,,, | Performed by: FAMILY MEDICINE

## 2024-04-01 NOTE — PROGRESS NOTES
[facility]    Chief Complaint      Chief Complaint   Patient presents with    Follow-up     Follow up on lab results       History of Present Illness      Jeff Starr is a 73 y.o. male with chronic conditions of  has a past medical history of Chronic pain syndrome, COPD (chronic obstructive pulmonary disease), COVID, HTN (hypertension), Localized chronic periodontitis, and Tobacco user.     HPI    The patient presents today for lab result review.  The patient had blood work on 04/01/2024.  He tested positive for hepatitis-C.  The patient states that he was positive for hepatitis-C years ago.  He did go through treatment, but states that he does not think his test results changed.    Past Medical History:  Past Medical History:   Diagnosis Date    Chronic pain syndrome 04/08/2023    COPD (chronic obstructive pulmonary disease)     COVID     HTN (hypertension)     Localized chronic periodontitis 03/21/2023    Tobacco user 03/21/2023       Past Surgical History:   has a past surgical history that includes Shoulder surgery (Right) and Hernia repair.    Social History:  Social History     Tobacco Use    Smoking status: Every Day     Current packs/day: 0.50     Average packs/day: 0.5 packs/day for 53.3 years (26.6 ttl pk-yrs)     Types: Cigarettes     Start date: 1971     Passive exposure: Current    Smokeless tobacco: Never   Substance Use Topics    Alcohol use: Not Currently    Drug use: Yes     Types: Marijuana     Comment: occasional per pain       I personally reviewed all past medical, surgical, and social.     Review of Systems   Constitutional:  Negative for chills and fever.   HENT:  Negative for ear pain and sore throat.    Eyes:  Negative for blurred vision.   Respiratory:  Negative for cough and shortness of breath.    Cardiovascular:  Negative for chest pain and palpitations.   Gastrointestinal:  Negative for abdominal pain and constipation.   Genitourinary:  Negative for dysuria and hematuria.    Musculoskeletal:  Negative for back pain and falls.   Skin:  Negative for itching and rash.   Neurological:  Negative for weakness and headaches.   Endo/Heme/Allergies:  Negative for polydipsia. Does not bruise/bleed easily.   Psychiatric/Behavioral:  Negative for suicidal ideas. The patient does not have insomnia.         Medications:  Outpatient Encounter Medications as of 4/1/2024   Medication Sig Dispense Refill    acetaminophen (TYLENOL) 650 MG TbSR Tylenol Arthritis Pain      albuterol (PROVENTIL/VENTOLIN HFA) 90 mcg/actuation inhaler Inhale 1 puff into the lungs every 4 (four) hours as needed for Shortness of Breath. 18 g 3    amLODIPine (NORVASC) 10 MG tablet Take 1 tablet (10 mg total) by mouth once daily. 90 tablet 0    ipratropium (ATROVENT) 21 mcg (0.03 %) nasal spray 2 sprays by Each Nostril route 2 (two) times daily. 30 mL 0    LIDOcaine (LMX) 4 % cream Apply topically as needed (shoulder). 28 g 2    loratadine (CLARITIN) 10 mg tablet Take 1 tablet (10 mg total) by mouth once daily. 90 tablet 0    naproxen (NAPROSYN) 500 MG tablet Take 1 tablet (500 mg total) by mouth 2 (two) times daily as needed (pain). 60 tablet 2     No facility-administered encounter medications on file as of 4/1/2024.       Allergies:  Review of patient's allergies indicates:  No Known Allergies    Health Maintenance:  Immunization History   Administered Date(s) Administered    COVID-19 MRNA, LN-S PF (MODERNA HALF 0.25 ML DOSE) 11/04/2021, 06/16/2022    COVID-19, MRNA, LN-S, PF (MODERNA FULL 0.5 ML DOSE) 01/20/2021, 02/26/2021    Influenza (FLUAD) - Quadrivalent - Adjuvanted - PF *Preferred* (65+) 11/16/2022    Pneumococcal Conjugate - 20 Valent 11/16/2022    Tdap 07/13/2015      Health Maintenance   Topic Date Due    Colorectal Cancer Screening  Never done    Shingles Vaccine (1 of 2) Never done    LDCT Lung Screen  02/06/2025    TETANUS VACCINE  07/13/2025    Lipid Panel  12/12/2028    Hepatitis C Screening  Completed     "Abdominal Aortic Aneurysm Screening  Completed        Physical Exam      Vital Signs  Pulse: 68  BP: 130/70  Height and Weight  Height: 5' 3" (160 cm)  Weight: 66.2 kg (146 lb)  BSA (Calculated - sq m): 1.72 sq meters  BMI (Calculated): 25.9  Weight in (lb) to have BMI = 25: 140.8]    Physical Exam  Vitals and nursing note reviewed.   Constitutional:       Appearance: Normal appearance.   HENT:      Head: Normocephalic and atraumatic.   Cardiovascular:      Rate and Rhythm: Normal rate and regular rhythm.      Heart sounds: Normal heart sounds.   Pulmonary:      Effort: Pulmonary effort is normal.      Breath sounds: Normal breath sounds.   Skin:     Findings: No rash.   Neurological:      General: No focal deficit present.      Mental Status: He is alert and oriented to person, place, and time.      Gait: Gait normal.   Psychiatric:         Mood and Affect: Mood normal.         Behavior: Behavior normal.         Thought Content: Thought content normal.         Judgment: Judgment normal.          Laboratory:  CBC:  Recent Labs   Lab 06/03/21  0839 04/14/22  0854   WBC 5.19 5.39   RBC 4.12 L 4.01 L   Hemoglobin 13.3 L 13.1 L   Hematocrit 40.1 40.2   Platelet Count 143 L 144 L   MCV 97.3 H 100.2 H   MCH 32.3 H 32.7 H   MCHC 33.2 32.6     CMP:  Recent Labs   Lab 03/21/23  1531 12/12/23  1620 03/20/24  1329   Glucose 74 93 101   Calcium 9.3 9.2 9.2   Albumin 3.9 3.6 4.0   Total Protein 7.7 7.6 7.4   Sodium 142 141 142   Potassium 4.0 4.0 3.9   CO2 28 29 28   Chloride 111 H 109 H 111 H   BUN 10 9 10   Alk Phos 85 91 86   ALT 45 47 50   AST 41 H 43 H 39 H   Bilirubin, Total 0.9 0.9 1.2     LIPIDS:  Recent Labs   Lab 06/03/21  0839 04/14/22  0854 12/12/23  1620   HDL Cholesterol 38 L 38 L 44   Cholesterol 148 151 146   Triglycerides 70 79 66   LDL Calculated 96 97 89   Cholesterol/HDL Ratio (Risk Factor) 3.9 4.0 3.3   Non- 113 102     TSH:      A1C:  Recent Labs   Lab 03/21/23  1531   Hemoglobin A1C 5.1 "       Assessment/Plan     Jeff Starr is a 73 y.o.male with:    1. Hepatitis C test positive  -     Ambulatory referral/consult to Gastroenterology; Future; Expected date: 04/17/2024  -     Hepatitis Panel, Acute; Future; Expected date: 04/01/2024  -     Hepatitis C Virus Quantitative        Chronic conditions status updated as per HPI.  Other than changes above, cont current medications and maintain follow up with specialists.  Return to clinic prn if symptoms worsen or fail to improve.    Karla Mead DO  [facility]

## 2024-04-03 LAB — HCV RNA SERPL NAA+PROBE-ACNC: ABNORMAL IU/ML

## 2024-04-17 NOTE — PROGRESS NOTES
[facility]    Chief Complaint      Chief Complaint   Patient presents with    Hypertension     3 month        History of Present Illness      Jeff Starr is a 73 y.o. male with chronic conditions of  has a past medical history of Chronic pain syndrome, COPD (chronic obstructive pulmonary disease), COVID, HTN (hypertension), Localized chronic periodontitis, and Tobacco user.     HPI    The patient presents today for a three month follow up visit for hypertension.He has no complaints      Past Medical History:  Past Medical History:   Diagnosis Date    Chronic pain syndrome 04/08/2023    COPD (chronic obstructive pulmonary disease)     COVID     HTN (hypertension)     Localized chronic periodontitis 03/21/2023    Tobacco user 03/21/2023       Past Surgical History:   has a past surgical history that includes Shoulder surgery (Right) and Hernia repair.    Social History:  Social History     Tobacco Use    Smoking status: Every Day     Current packs/day: 0.50     Average packs/day: 0.5 packs/day for 53.3 years (26.6 ttl pk-yrs)     Types: Cigarettes     Start date: 1971     Passive exposure: Current    Smokeless tobacco: Never   Substance Use Topics    Alcohol use: Not Currently    Drug use: Yes     Types: Marijuana     Comment: occasional per pain       I personally reviewed all past medical, surgical, and social.     ROS     Medications:  Outpatient Encounter Medications as of 3/20/2024   Medication Sig Dispense Refill    acetaminophen (TYLENOL) 650 MG TbSR Tylenol Arthritis Pain      LIDOcaine (LMX) 4 % cream Apply topically as needed (shoulder). 28 g 2    [DISCONTINUED] albuterol (PROVENTIL/VENTOLIN HFA) 90 mcg/actuation inhaler Inhale 1 puff into the lungs every 4 (four) hours as needed for Shortness of Breath. 18 g 3    [DISCONTINUED] amLODIPine (NORVASC) 10 MG tablet Take 1 tablet (10 mg total) by mouth once daily. 90 tablet 0    [DISCONTINUED] ipratropium (ATROVENT) 21 mcg (0.03 %) nasal spray 2  "sprays by Each Nostril route 2 (two) times daily. 30 mL 0    [DISCONTINUED] loratadine (CLARITIN) 10 mg tablet Take 10 mg by mouth once daily.      [DISCONTINUED] naproxen (NAPROSYN) 500 MG tablet Take 1 tablet (500 mg total) by mouth 2 (two) times daily as needed (pain). 60 tablet 2    albuterol (PROVENTIL/VENTOLIN HFA) 90 mcg/actuation inhaler Inhale 1 puff into the lungs every 4 (four) hours as needed for Shortness of Breath. 18 g 3    amLODIPine (NORVASC) 10 MG tablet Take 1 tablet (10 mg total) by mouth once daily. 90 tablet 0    ipratropium (ATROVENT) 21 mcg (0.03 %) nasal spray 2 sprays by Each Nostril route 2 (two) times daily. 30 mL 0    loratadine (CLARITIN) 10 mg tablet Take 1 tablet (10 mg total) by mouth once daily. 90 tablet 0    naproxen (NAPROSYN) 500 MG tablet Take 1 tablet (500 mg total) by mouth 2 (two) times daily as needed (pain). 60 tablet 2     No facility-administered encounter medications on file as of 3/20/2024.       Allergies:  Review of patient's allergies indicates:  No Known Allergies    Health Maintenance:  Immunization History   Administered Date(s) Administered    COVID-19 MRNA, LN-S PF (MODERNA HALF 0.25 ML DOSE) 11/04/2021, 06/16/2022    COVID-19, MRNA, LN-S, PF (MODERNA FULL 0.5 ML DOSE) 01/20/2021, 02/26/2021    Influenza (FLUAD) - Quadrivalent - Adjuvanted - PF *Preferred* (65+) 11/16/2022    Pneumococcal Conjugate - 20 Valent 11/16/2022    Tdap 07/13/2015      Health Maintenance   Topic Date Due    Colorectal Cancer Screening  Never done    Shingles Vaccine (1 of 2) Never done    LDCT Lung Screen  02/06/2025    TETANUS VACCINE  07/13/2025    Lipid Panel  12/12/2028    Hepatitis C Screening  Completed    Abdominal Aortic Aneurysm Screening  Completed        Physical Exam      Vital Signs  Pulse: 63  SpO2: 99 %  BP: 115/72  BP Location: Right arm  Patient Position: Sitting  Height and Weight  Height: 5' 3" (160 cm)  Weight: 65.3 kg (144 lb)  BSA (Calculated - sq m): 1.7 sq " meters  BMI (Calculated): 25.5  Weight in (lb) to have BMI = 25: 140.8]    Physical Exam     Laboratory:  CBC:  Recent Labs   Lab 06/03/21  0839 04/14/22  0854   WBC 5.19 5.39   RBC 4.12 L 4.01 L   Hemoglobin 13.3 L 13.1 L   Hematocrit 40.1 40.2   Platelet Count 143 L 144 L   MCV 97.3 H 100.2 H   MCH 32.3 H 32.7 H   MCHC 33.2 32.6     CMP:  Recent Labs   Lab 03/21/23  1531 12/12/23  1620 03/20/24  1329   Glucose 74 93 101   Calcium 9.3 9.2 9.2   Albumin 3.9 3.6 4.0   Total Protein 7.7 7.6 7.4   Sodium 142 141 142   Potassium 4.0 4.0 3.9   CO2 28 29 28   Chloride 111 H 109 H 111 H   BUN 10 9 10   Alk Phos 85 91 86   ALT 45 47 50   AST 41 H 43 H 39 H   Bilirubin, Total 0.9 0.9 1.2     LIPIDS:  Recent Labs   Lab 06/03/21  0839 04/14/22  0854 12/12/23  1620   HDL Cholesterol 38 L 38 L 44   Cholesterol 148 151 146   Triglycerides 70 79 66   LDL Calculated 96 97 89   Cholesterol/HDL Ratio (Risk Factor) 3.9 4.0 3.3   Non- 113 102     TSH:      A1C:  Recent Labs   Lab 03/21/23  1531   Hemoglobin A1C 5.1       Assessment/Plan     Jeff Starr is a 73 y.o.male with:    1. Seasonal allergic rhinitis due to other allergic trigger  -     loratadine (CLARITIN) 10 mg tablet; Take 1 tablet (10 mg total) by mouth once daily.  Dispense: 90 tablet; Refill: 0    2. Chronic obstructive pulmonary disease, unspecified COPD type  Overview:  Albuterol 1 puff every 4 hours as needed    Orders:  -     albuterol (PROVENTIL/VENTOLIN HFA) 90 mcg/actuation inhaler; Inhale 1 puff into the lungs every 4 (four) hours as needed for Shortness of Breath.  Dispense: 18 g; Refill: 3    3. Hypertension, unspecified type  Overview:  Goal BP less than 140/80   Amlodipine 10 mg oral daily    Orders:  -     amLODIPine (NORVASC) 10 MG tablet; Take 1 tablet (10 mg total) by mouth once daily.  Dispense: 90 tablet; Refill: 0  -     Comprehensive Metabolic Panel; Future    4. COPD exacerbation  -     ipratropium (ATROVENT) 21 mcg (0.03 %) nasal spray; 2  sprays by Each Nostril route 2 (two) times daily.  Dispense: 30 mL; Refill: 0    5. Osteoarthritis of multiple joints, unspecified osteoarthritis type  Overview:  Acetaminophen 650 mg every 6 hours as needed   Naproxen 500 mg b.i.d. as needed   Lidocaine 4% cream to painful joints as directed    Orders:  -     naproxen (NAPROSYN) 500 MG tablet; Take 1 tablet (500 mg total) by mouth 2 (two) times daily as needed (pain).  Dispense: 60 tablet; Refill: 2    6. Encounter for hepatitis C screening test for low risk patient  -     Hepatitis C Antibody; Future; Expected date: 03/20/2024  -     Hepatitis C Virus Quantitative        Chronic conditions status updated as per HPI.  Other than changes above, cont current medications and maintain follow up with specialists.  Return to clinic in 3 month(s) for medication refills.    Karla Mead DO  [facility]

## 2024-04-25 DIAGNOSIS — Z12.11 SPECIAL SCREENING FOR MALIGNANT NEOPLASMS, COLON: Primary | ICD-10-CM

## 2024-04-25 RX ORDER — POLYETHYLENE GLYCOL 3350, SODIUM SULFATE ANHYDROUS, SODIUM BICARBONATE, SODIUM CHLORIDE, POTASSIUM CHLORIDE 236; 22.74; 6.74; 5.86; 2.97 G/4L; G/4L; G/4L; G/4L; G/4L
4 POWDER, FOR SOLUTION ORAL ONCE
Qty: 4000 ML | Refills: 0 | Status: SHIPPED | OUTPATIENT
Start: 2024-04-25 | End: 2024-04-25

## 2024-05-06 ENCOUNTER — HOSPITAL ENCOUNTER (OUTPATIENT)
Dept: GASTROENTEROLOGY | Facility: HOSPITAL | Age: 74
Discharge: HOME | End: 2024-05-06
Attending: NURSE PRACTITIONER
Payer: COMMERCIAL

## 2024-05-06 DIAGNOSIS — Z12.11 SCREENING FOR MALIGNANT NEOPLASM OF COLON: ICD-10-CM

## 2024-05-06 NOTE — H&P
Patient didn't prep well for colonoscopy and chose to reschedule. I did not see patient.    Sincerely,  Govind Acosta MD  Gastroenterology

## 2024-06-17 ENCOUNTER — ANESTHESIA EVENT (OUTPATIENT)
Dept: GASTROENTEROLOGY | Facility: HOSPITAL | Age: 74
End: 2024-06-17
Payer: COMMERCIAL

## 2024-06-17 ENCOUNTER — ANESTHESIA (OUTPATIENT)
Dept: GASTROENTEROLOGY | Facility: HOSPITAL | Age: 74
End: 2024-06-17
Payer: COMMERCIAL

## 2024-06-17 ENCOUNTER — OFFICE VISIT (OUTPATIENT)
Dept: GASTROENTEROLOGY | Facility: CLINIC | Age: 74
End: 2024-06-17
Payer: COMMERCIAL

## 2024-06-17 ENCOUNTER — HOSPITAL ENCOUNTER (OUTPATIENT)
Dept: GASTROENTEROLOGY | Facility: HOSPITAL | Age: 74
Discharge: HOME | End: 2024-06-17
Attending: INTERNAL MEDICINE | Admitting: INTERNAL MEDICINE
Payer: COMMERCIAL

## 2024-06-17 VITALS
OXYGEN SATURATION: 96 % | SYSTOLIC BLOOD PRESSURE: 101 MMHG | WEIGHT: 154 LBS | TEMPERATURE: 97 F | HEART RATE: 75 BPM | HEIGHT: 63 IN | BODY MASS INDEX: 27.29 KG/M2 | DIASTOLIC BLOOD PRESSURE: 59 MMHG | RESPIRATION RATE: 13 BRPM

## 2024-06-17 DIAGNOSIS — Z12.11 SCREENING FOR MALIGNANT NEOPLASM OF COLON: ICD-10-CM

## 2024-06-17 DIAGNOSIS — B19.20 HEPATITIS C TEST POSITIVE: ICD-10-CM

## 2024-06-17 PROCEDURE — D9220A PRA ANESTHESIA: Mod: PT,,, | Performed by: NURSE ANESTHETIST, CERTIFIED REGISTERED

## 2024-06-17 PROCEDURE — 37000009 HC ANESTHESIA EA ADD 15 MINS

## 2024-06-17 PROCEDURE — 45380 COLONOSCOPY AND BIOPSY: CPT | Mod: 59,33,, | Performed by: INTERNAL MEDICINE

## 2024-06-17 PROCEDURE — 27201423 OPTIME MED/SURG SUP & DEVICES STERILE SUPPLY

## 2024-06-17 PROCEDURE — 88305 TISSUE EXAM BY PATHOLOGIST: CPT | Mod: TC,91,SUR | Performed by: INTERNAL MEDICINE

## 2024-06-17 PROCEDURE — 37000008 HC ANESTHESIA 1ST 15 MINUTES

## 2024-06-17 PROCEDURE — 99999 PR PBB SHADOW E&M-EST. PATIENT-LVL III: CPT | Mod: PBBFAC,,,

## 2024-06-17 PROCEDURE — 45380 COLONOSCOPY AND BIOPSY: CPT | Mod: PT,59 | Performed by: INTERNAL MEDICINE

## 2024-06-17 PROCEDURE — 45385 COLONOSCOPY W/LESION REMOVAL: CPT | Mod: PT | Performed by: INTERNAL MEDICINE

## 2024-06-17 PROCEDURE — 45385 COLONOSCOPY W/LESION REMOVAL: CPT | Mod: 33,,, | Performed by: INTERNAL MEDICINE

## 2024-06-17 PROCEDURE — 25000003 PHARM REV CODE 250: Performed by: NURSE ANESTHETIST, CERTIFIED REGISTERED

## 2024-06-17 PROCEDURE — 99213 OFFICE O/P EST LOW 20 MIN: CPT | Mod: PBBFAC,25

## 2024-06-17 PROCEDURE — 63600175 PHARM REV CODE 636 W HCPCS: Performed by: NURSE ANESTHETIST, CERTIFIED REGISTERED

## 2024-06-17 RX ORDER — SODIUM CHLORIDE 0.9 % (FLUSH) 0.9 %
10 SYRINGE (ML) INJECTION
Status: DISCONTINUED | OUTPATIENT
Start: 2024-06-17 | End: 2024-06-18 | Stop reason: HOSPADM

## 2024-06-17 RX ORDER — LIDOCAINE HYDROCHLORIDE 20 MG/ML
INJECTION, SOLUTION EPIDURAL; INFILTRATION; INTRACAUDAL; PERINEURAL
Status: DISCONTINUED | OUTPATIENT
Start: 2024-06-17 | End: 2024-06-17

## 2024-06-17 RX ORDER — PROPOFOL 10 MG/ML
VIAL (ML) INTRAVENOUS
Status: DISCONTINUED | OUTPATIENT
Start: 2024-06-17 | End: 2024-06-17

## 2024-06-17 RX ORDER — ALBENDAZOLE 200 MG/1
400 TABLET, FILM COATED ORAL 2 TIMES DAILY WITH MEALS
Qty: 12 TABLET | Refills: 0 | Status: SHIPPED | OUTPATIENT
Start: 2024-06-17 | End: 2024-06-20

## 2024-06-17 RX ADMIN — PROPOFOL 50 MG: 10 INJECTION, EMULSION INTRAVENOUS at 02:06

## 2024-06-17 RX ADMIN — SODIUM CHLORIDE: 9 INJECTION, SOLUTION INTRAVENOUS at 02:06

## 2024-06-17 RX ADMIN — LIDOCAINE HYDROCHLORIDE 50 MG: 20 INJECTION, SOLUTION INTRAVENOUS at 02:06

## 2024-06-17 NOTE — ANESTHESIA PREPROCEDURE EVALUATION
06/17/2024  Jeff Starr is a 74 y.o., male.      Pre-op Assessment    I have reviewed the Patient Summary Reports.     I have reviewed the Nursing Notes. I have reviewed the NPO Status.   I have reviewed the Medications.     Review of Systems  Anesthesia Hx:  No problems with previous Anesthesia             Denies Family Hx of Anesthesia complications.    Denies Personal Hx of Anesthesia complications.                    Social:  Smoker       Hematology/Oncology:  Hematology Normal   Oncology Normal                                   EENT/Dental:  EENT/Dental Normal           Cardiovascular:     Hypertension              ECG has been reviewed.                    Hypertension         Pulmonary:   COPD         Chronic Obstructive Pulmonary Disease (COPD):                      Renal/:  Renal/ Normal                 Hepatic/GI:  Bowel Prep.                Musculoskeletal:  Arthritis        Arthritis          Neurological:        Chronic Pain Syndrome Arthritis                           Dermatological:  Skin Normal    Psych:  Psychiatric Normal                Physical Exam  General: Well nourished    Airway:  Mallampati: II   Mouth Opening: Normal  TM Distance: Normal  Tongue: Normal  Neck ROM: Normal ROM    Dental:  Intact    Anesthesia Plan  Type of Anesthesia, risks & benefits discussed:    Anesthesia Type: Gen Natural Airway  Intra-op Monitoring Plan: Standard ASA Monitors  Post Op Pain Control Plan: multimodal analgesia  Induction:  IV  Informed Consent: Informed consent signed with the Patient and all parties understand the risks and agree with anesthesia plan.  All questions answered. Patient consented to blood products? Yes  ASA Score: 3  Day of Surgery Review of History & Physical: H&P Update referred to the surgeon/provider.I have interviewed and examined the patient. I have reviewed the  patient's H&P dated: There are no significant changes.     Ready For Surgery From Anesthesia Perspective.   .

## 2024-06-17 NOTE — DISCHARGE INSTRUCTIONS
Procedure Date  6/17/24     Impression  Overall Impression:   4 subcentimeter polyps in the ascending colon were removed with cold snare  Helminth in the ascending colon, biopsied   Erythema with erosion in the transverse colon, biopsied   The ileocecal valve, cecum and descending colon appeared normal.  (grade 2) hemorrhoids        Recommendation  Await pathology results  Start Albendazole therapy for helminth treatment  Will need stool Ova/Parasite exam after treatment    Repeat colonoscopy in 3 years        DO NOT DRIVE FOR 24 HOURS OR OPERATE HEAVY MACHINERY.   DO NOT SIGN LEGAL DOCUMENTS.  DRINK PLENTY OF FLUIDS. RESUME DIET.

## 2024-06-17 NOTE — H&P
Gastroenterology Pre-procedure H&P    History of Present Illness    Jeff Starr is a 74 y.o. male that  has a past medical history of Chronic pain syndrome (04/08/2023), COPD (chronic obstructive pulmonary disease), COVID, HTN (hypertension), Localized chronic periodontitis (03/21/2023), and Tobacco user (03/21/2023).     Patient here for routine surveillance    Patient denies wt loss, abdominal pain, diarrhea, melena/hematochezia, change in stool caliber, no anticoagulants, FMHx of GI related malignancies.      Past Medical History:   Diagnosis Date    Chronic pain syndrome 04/08/2023    COPD (chronic obstructive pulmonary disease)     COVID     HTN (hypertension)     Localized chronic periodontitis 03/21/2023    Tobacco user 03/21/2023       Past Surgical History:   Procedure Laterality Date    HERNIA REPAIR      SHOULDER SURGERY Right        Family History   Problem Relation Name Age of Onset    Hypertension Mother      Diabetes Father      Hypertension Father      Coronary artery disease Father      Heart attack Father      Asthma Sister      Hypertension Sister         Social History     Socioeconomic History    Marital status:    Tobacco Use    Smoking status: Every Day     Current packs/day: 0.50     Average packs/day: 0.5 packs/day for 53.5 years (26.7 ttl pk-yrs)     Types: Cigarettes     Start date: 1971     Passive exposure: Current    Smokeless tobacco: Never   Substance and Sexual Activity    Alcohol use: Not Currently    Drug use: Yes     Types: Marijuana     Comment: occasional per pain    Sexual activity: Yes     Social Determinants of Health     Financial Resource Strain: Low Risk  (11/3/2023)    Overall Financial Resource Strain (CARDIA)     Difficulty of Paying Living Expenses: Not hard at all   Food Insecurity: No Food Insecurity (11/3/2023)    Hunger Vital Sign     Worried About Running Out of Food in the Last Year: Never true     Ran Out of Food in the Last Year: Never true    Transportation Needs: No Transportation Needs (11/3/2023)    PRAPARE - Transportation     Lack of Transportation (Medical): No     Lack of Transportation (Non-Medical): No   Physical Activity: Inactive (11/3/2023)    Exercise Vital Sign     Days of Exercise per Week: 0 days     Minutes of Exercise per Session: 0 min   Stress: No Stress Concern Present (11/3/2023)    [employer] - Occupational Stress Questionnaire     Feeling of Stress : Not at all   Housing Stability: Low Risk  (11/3/2023)    Housing Stability Vital Sign     Unable to Pay for Housing in the Last Year: No     Number of Places Lived in the Last Year: 1     Unstable Housing in the Last Year: No       Current Outpatient Medications   Medication Sig Dispense Refill    acetaminophen (TYLENOL) 650 MG TbSR Tylenol Arthritis Pain      albuterol (PROVENTIL/VENTOLIN HFA) 90 mcg/actuation inhaler Inhale 1 puff into the lungs every 4 (four) hours as needed for Shortness of Breath. 18 g 3    amLODIPine (NORVASC) 10 MG tablet Take 1 tablet (10 mg total) by mouth once daily. 90 tablet 0    ipratropium (ATROVENT) 21 mcg (0.03 %) nasal spray 2 sprays by Each Nostril route 2 (two) times daily. 30 mL 0    LIDOcaine (LMX) 4 % cream Apply topically as needed (shoulder). 28 g 2    loratadine (CLARITIN) 10 mg tablet Take 1 tablet (10 mg total) by mouth once daily. 90 tablet 0    naproxen (NAPROSYN) 500 MG tablet Take 1 tablet (500 mg total) by mouth 2 (two) times daily as needed (pain). 60 tablet 2     No current facility-administered medications for this encounter.     Facility-Administered Medications Ordered in Other Encounters   Medication Dose Route Frequency Provider Last Rate Last Admin    sodium chloride 0.9% infusion   Intravenous Continuous PRN Beka Mosqueda CRNA   New Bag at 06/17/24 1432       Review of patient's allergies indicates:  No Known Allergies    Objective:  Vitals:    06/17/24 1414   BP: (!) 139/95   Pulse: 67  "  Resp: 17   Temp: 97.9 °F (36.6 °C)   TempSrc: Oral   SpO2: 99%   Weight: 69.9 kg (154 lb)   Height: 5' 3" (1.6 m)        GEN: normal appearing, NAD, AAO x3  HENT: NCAT, anicteric, OP benign  CV: normal rate, regular rhythm  RESP: NABS, symmetric rise, unlabored  ABD: soft, ND, no guarding or TTP  SKIN: warm and dry  NEURO: grossly afocal    Assessment and Plan:    Proceed with:    Colonoscopy for previous adenomatous polyp, screening for colon cancer    Govind Acosta MD  Gastroenterology  "

## 2024-06-17 NOTE — OR NURSING
Spoke to Edison to inform that patient had lab ordered by SKY Ruiz during clinic visit today and that he was added on to GI procedures for a colonoscopy. Unaware of this order until discharging patient due to the separate computer systems. Edison stated she will call patient to inform of lab on 6/18/2024.

## 2024-06-17 NOTE — ANESTHESIA POSTPROCEDURE EVALUATION
Anesthesia Post Evaluation    Patient: Jeff Starr    Procedure(s) Performed:Colonoscopy    Final Anesthesia Type: general      Patient location during evaluation: GI PACU  Patient participation: Yes- Able to Participate  Level of consciousness: awake and alert and oriented  Post-procedure vital signs: reviewed and stable  Pain management: adequate  Airway patency: patent    PONV status at discharge: No PONV  Anesthetic complications: no      Cardiovascular status: blood pressure returned to baseline and stable  Respiratory status: unassisted, spontaneous ventilation and room air  Hydration status: euvolemic  Follow-up not needed.  Comments: Refer to nursing note for pain/henri score upon discharge from recovery.               Vitals Value Taken Time   BP 94/36 06/17/24 1501   Temp 36 06/17/24 1501   Pulse 81 06/17/24 1501   Resp 16 06/17/24 1501   SpO2 95 06/17/24 1501         No case tracking events are documented in the log.      Pain/Henri Score: No data recorded

## 2024-06-17 NOTE — TRANSFER OF CARE
"Anesthesia Transfer of Care Note    Patient: Jeff Starr    Procedure(s) Performed: Colonoscopy    Patient location: GI    Anesthesia Type: general    Transport from OR: Transported from OR on room air with adequate spontaneous ventilation. Continuous ECG monitoring in transport. Continuous SpO2 monitoring in transport    Post pain: adequate analgesia    Post assessment: no apparent anesthetic complications    Post vital signs: stable    Level of consciousness: responds to stimulation, awake and sedated    Nausea/Vomiting: no nausea/vomiting    Complications: none    Transfer of care protocol was followed      Last vitals: Visit Vitals  BP (!) 100/44 (BP Location: Right arm, Patient Position: Lying)   Pulse 82   Temp 36 °C (96.8 °F) (Oral)   Resp 16   Ht 5' 3" (1.6 m)   Wt 69.9 kg (154 lb)   SpO2 (!) 94%   BMI 27.28 kg/m²     "

## 2024-06-18 LAB
ESTROGEN SERPL-MCNC: NORMAL PG/ML
INSULIN SERPL-ACNC: NORMAL U[IU]/ML
LAB AP GROSS DESCRIPTION: NORMAL
LAB AP LABORATORY NOTES: NORMAL
T3RU NFR SERPL: NORMAL %

## 2024-06-20 DIAGNOSIS — B76.9 HOOKWORM INFECTION: Primary | ICD-10-CM

## 2024-06-20 NOTE — PROGRESS NOTES
Letter sent. Repeat 3 years. Eosinophils likely related to helminth infection. Ordered stool O/P to ensure eradication.

## 2024-06-21 ENCOUNTER — TELEPHONE (OUTPATIENT)
Dept: GASTROENTEROLOGY | Facility: CLINIC | Age: 74
End: 2024-06-21
Payer: COMMERCIAL

## 2024-06-21 NOTE — TELEPHONE ENCOUNTER
Attempted to contact patient, no answer. No VM set up, unable to inform patient of results or leave a message for patient to return call to discuss.

## 2024-06-21 NOTE — TELEPHONE ENCOUNTER
----- Message from SKY De Jesus sent at 6/21/2024  8:17 AM CDT -----  Labs are ok. Awaiting imaging before proceeding with treatment for HCV.

## 2024-06-27 ENCOUNTER — OFFICE VISIT (OUTPATIENT)
Dept: FAMILY MEDICINE | Facility: CLINIC | Age: 74
End: 2024-06-27
Payer: COMMERCIAL

## 2024-06-27 VITALS
WEIGHT: 142 LBS | BODY MASS INDEX: 25.16 KG/M2 | DIASTOLIC BLOOD PRESSURE: 60 MMHG | SYSTOLIC BLOOD PRESSURE: 120 MMHG | HEIGHT: 63 IN

## 2024-06-27 DIAGNOSIS — J30.89 SEASONAL ALLERGIC RHINITIS DUE TO OTHER ALLERGIC TRIGGER: ICD-10-CM

## 2024-06-27 DIAGNOSIS — I10 PRIMARY HYPERTENSION: Primary | ICD-10-CM

## 2024-06-27 DIAGNOSIS — M15.9 OSTEOARTHRITIS OF MULTIPLE JOINTS, UNSPECIFIED OSTEOARTHRITIS TYPE: ICD-10-CM

## 2024-06-27 DIAGNOSIS — I49.9 CARDIAC ARRHYTHMIA, UNSPECIFIED CARDIAC ARRHYTHMIA TYPE: ICD-10-CM

## 2024-06-27 DIAGNOSIS — J44.9 CHRONIC OBSTRUCTIVE PULMONARY DISEASE, UNSPECIFIED COPD TYPE: ICD-10-CM

## 2024-06-27 PROCEDURE — 3008F BODY MASS INDEX DOCD: CPT | Mod: ,,, | Performed by: FAMILY MEDICINE

## 2024-06-27 PROCEDURE — 3288F FALL RISK ASSESSMENT DOCD: CPT | Mod: ,,, | Performed by: FAMILY MEDICINE

## 2024-06-27 PROCEDURE — 3074F SYST BP LT 130 MM HG: CPT | Mod: ,,, | Performed by: FAMILY MEDICINE

## 2024-06-27 PROCEDURE — 99214 OFFICE O/P EST MOD 30 MIN: CPT | Mod: ,,, | Performed by: FAMILY MEDICINE

## 2024-06-27 PROCEDURE — 3078F DIAST BP <80 MM HG: CPT | Mod: ,,, | Performed by: FAMILY MEDICINE

## 2024-06-27 PROCEDURE — 1160F RVW MEDS BY RX/DR IN RCRD: CPT | Mod: ,,, | Performed by: FAMILY MEDICINE

## 2024-06-27 PROCEDURE — G2211 COMPLEX E/M VISIT ADD ON: HCPCS | Mod: ,,, | Performed by: FAMILY MEDICINE

## 2024-06-27 PROCEDURE — 1101F PT FALLS ASSESS-DOCD LE1/YR: CPT | Mod: ,,, | Performed by: FAMILY MEDICINE

## 2024-06-27 PROCEDURE — 1159F MED LIST DOCD IN RCRD: CPT | Mod: ,,, | Performed by: FAMILY MEDICINE

## 2024-06-27 RX ORDER — NAPROXEN 500 MG/1
500 TABLET ORAL 2 TIMES DAILY PRN
Qty: 60 TABLET | Refills: 2 | Status: SHIPPED | OUTPATIENT
Start: 2024-06-27

## 2024-06-27 RX ORDER — LORATADINE 10 MG/1
10 TABLET ORAL DAILY
Qty: 90 TABLET | Refills: 1 | Status: SHIPPED | OUTPATIENT
Start: 2024-06-27

## 2024-06-27 RX ORDER — LIDOCAINE 40 MG/G
CREAM TOPICAL
Qty: 28 G | Refills: 2 | Status: SHIPPED | OUTPATIENT
Start: 2024-06-27

## 2024-06-27 RX ORDER — AMLODIPINE BESYLATE 10 MG/1
10 TABLET ORAL DAILY
Qty: 90 TABLET | Refills: 1 | Status: SHIPPED | OUTPATIENT
Start: 2024-06-27

## 2024-06-27 RX ORDER — IPRATROPIUM BROMIDE 21 UG/1
2 SPRAY, METERED NASAL 2 TIMES DAILY
Qty: 30 ML | Refills: 0 | Status: SHIPPED | OUTPATIENT
Start: 2024-06-27

## 2024-06-27 RX ORDER — ALBUTEROL SULFATE 90 UG/1
1 AEROSOL, METERED RESPIRATORY (INHALATION) EVERY 4 HOURS PRN
Qty: 18 G | Refills: 3 | Status: SHIPPED | OUTPATIENT
Start: 2024-06-27

## 2024-06-27 NOTE — PROGRESS NOTES
[facility]    Chief Complaint      Chief Complaint   Patient presents with    Hypertension    Medication Refill       History of Present Illness      Jeff Starr is a 74 y.o. male that  has a past medical history of Chronic pain syndrome, COPD (chronic obstructive pulmonary disease), COVID, HTN (hypertension), Localized chronic periodontitis, and Tobacco user.     HPI    The patient presents today for a three month follow up visit for hypertension.He has no complaints    Hypertension  Pertinent negatives include no blurred vision, chest pain, headaches, palpitations or shortness of breath.   Medication Refill  Pertinent negatives include no abdominal pain, chest pain, chills, coughing, fever, headaches, rash, sore throat or weakness.       Past Medical History:  Past Medical History:   Diagnosis Date    Chronic pain syndrome 04/08/2023    COPD (chronic obstructive pulmonary disease)     COVID     HTN (hypertension)     Localized chronic periodontitis 03/21/2023    Tobacco user 03/21/2023       Past Surgical History:   has a past surgical history that includes Shoulder surgery (Right) and Hernia repair.    Social History:  Social History     Tobacco Use    Smoking status: Every Day     Current packs/day: 0.50     Average packs/day: 0.5 packs/day for 53.5 years (26.7 ttl pk-yrs)     Types: Cigarettes     Start date: 1971     Passive exposure: Current    Smokeless tobacco: Never   Substance Use Topics    Alcohol use: Not Currently    Drug use: Yes     Types: Marijuana     Comment: occasional per pain       I personally reviewed all past medical, surgical, and social.     Review of Systems   Constitutional:  Negative for chills and fever.   HENT:  Negative for ear pain and sore throat.    Eyes:  Negative for blurred vision.   Respiratory:  Negative for cough and shortness of breath.    Cardiovascular:  Negative for chest pain and palpitations.   Gastrointestinal:  Negative for abdominal pain and  constipation.   Genitourinary:  Negative for dysuria and hematuria.   Musculoskeletal:  Negative for back pain and falls.   Skin:  Negative for itching and rash.   Neurological:  Negative for weakness and headaches.   Endo/Heme/Allergies:  Negative for polydipsia. Does not bruise/bleed easily.   Psychiatric/Behavioral:  Negative for suicidal ideas. The patient does not have insomnia.         Medications:  Outpatient Encounter Medications as of 6/27/2024   Medication Sig Dispense Refill    acetaminophen (TYLENOL) 650 MG TbSR Tylenol Arthritis Pain      [DISCONTINUED] albuterol (PROVENTIL/VENTOLIN HFA) 90 mcg/actuation inhaler Inhale 1 puff into the lungs every 4 (four) hours as needed for Shortness of Breath. 18 g 3    [DISCONTINUED] amLODIPine (NORVASC) 10 MG tablet Take 1 tablet (10 mg total) by mouth once daily. 90 tablet 0    [DISCONTINUED] ipratropium (ATROVENT) 21 mcg (0.03 %) nasal spray 2 sprays by Each Nostril route 2 (two) times daily. 30 mL 0    [DISCONTINUED] LIDOcaine (LMX) 4 % cream Apply topically as needed (shoulder). 28 g 2    [DISCONTINUED] loratadine (CLARITIN) 10 mg tablet Take 1 tablet (10 mg total) by mouth once daily. 90 tablet 0    [DISCONTINUED] naproxen (NAPROSYN) 500 MG tablet Take 1 tablet (500 mg total) by mouth 2 (two) times daily as needed (pain). 60 tablet 2    albuterol (PROVENTIL/VENTOLIN HFA) 90 mcg/actuation inhaler Inhale 1 puff into the lungs every 4 (four) hours as needed for Shortness of Breath. 18 g 3    amLODIPine (NORVASC) 10 MG tablet Take 1 tablet (10 mg total) by mouth once daily. 90 tablet 1    ipratropium (ATROVENT) 21 mcg (0.03 %) nasal spray 2 sprays by Each Nostril route 2 (two) times daily. 30 mL 0    LIDOcaine (LMX) 4 % cream Apply topically as needed (shoulder). 28 g 2    loratadine (CLARITIN) 10 mg tablet Take 1 tablet (10 mg total) by mouth once daily. 90 tablet 1    naproxen (NAPROSYN) 500 MG tablet Take 1 tablet (500 mg total) by mouth 2 (two) times daily as  "needed (pain). 60 tablet 2     No facility-administered encounter medications on file as of 6/27/2024.       Allergies:  Review of patient's allergies indicates:  No Known Allergies    Health Maintenance:  Immunization History   Administered Date(s) Administered    COVID-19 MRNA, LN-S PF (MODERNA HALF 0.25 ML DOSE) 11/04/2021, 06/16/2022    COVID-19, MRNA, LN-S, PF (MODERNA FULL 0.5 ML DOSE) 01/20/2021, 02/26/2021    Influenza (FLUAD) - Quadrivalent - Adjuvanted - PF *Preferred* (65+) 11/16/2022    Pneumococcal Conjugate - 20 Valent 11/16/2022    Tdap 07/13/2015      Health Maintenance   Topic Date Due    Shingles Vaccine (1 of 2) Never done    LDCT Lung Screen  02/06/2025    TETANUS VACCINE  07/13/2025    Colorectal Cancer Screening  06/17/2027    Lipid Panel  12/12/2028    Hepatitis C Screening  Completed    Abdominal Aortic Aneurysm Screening  Completed        Physical Exam      Vital Signs  BP: 120/60  Height and Weight  Height: 5' 3" (160 cm)  Weight: 64.4 kg (142 lb)  BSA (Calculated - sq m): 1.69 sq meters  BMI (Calculated): 25.2  Weight in (lb) to have BMI = 25: 140.8]    Physical Exam  Vitals and nursing note reviewed.   Constitutional:       Appearance: Normal appearance.   HENT:      Head: Normocephalic and atraumatic.      Nose: Nose normal.   Eyes:      Extraocular Movements: Extraocular movements intact.      Conjunctiva/sclera: Conjunctivae normal.      Pupils: Pupils are equal, round, and reactive to light.   Cardiovascular:      Rate and Rhythm: Normal rate. Rhythm irregular.      Heart sounds: Normal heart sounds.   Pulmonary:      Effort: Pulmonary effort is normal.      Breath sounds: Normal breath sounds.   Skin:     Findings: No rash.   Neurological:      General: No focal deficit present.      Mental Status: He is alert and oriented to person, place, and time. Mental status is at baseline.      Cranial Nerves: No cranial nerve deficit.      Gait: Gait normal.   Psychiatric:         Mood and " Affect: Mood normal.         Behavior: Behavior normal.         Thought Content: Thought content normal.         Judgment: Judgment normal.          Laboratory:  CBC:  Recent Labs   Lab 04/14/22  0854 06/18/24  1035   WBC 5.39 4.07 L   RBC 4.01 L 4.06 L   Hemoglobin 13.1 L 13.2 L   Hematocrit 40.2 39.7 L   Platelet Count 144 L 129 L   .2 H 97.8 H   MCH 32.7 H 32.5 H   MCHC 32.6 33.2     CMP:  Recent Labs   Lab 12/12/23  1620 03/20/24  1329 06/18/24  1035   Glucose 93 101 118 H   Calcium 9.2 9.2 8.7   Albumin 3.6 4.0 3.4 L   Total Protein 7.6 7.4 7.2   Sodium 141 142 140   Potassium 4.0 3.9 3.7   CO2 29 28 23   Chloride 109 H 111 H 111 H   BUN 9 10 14   Alk Phos 91 86 104   ALT 47 50 55   AST 43 H 39 H 55 H   Bilirubin, Total 0.9 1.2 0.5     LIPIDS:  Recent Labs   Lab 04/14/22  0854 12/12/23  1620   HDL Cholesterol 38 L 44   Cholesterol 151 146   Triglycerides 79 66   LDL Calculated 97 89   Cholesterol/HDL Ratio (Risk Factor) 4.0 3.3   Non- 102     TSH:      A1C:  Recent Labs   Lab 03/21/23  1531   Hemoglobin A1C 5.1       Assessment/Plan     Jeff Starr is a 74 y.o.male with:    1. Primary hypertension  Overview:  Goal BP less than 140/80   Amlodipine 10 mg oral daily    Orders:  -     amLODIPine (NORVASC) 10 MG tablet; Take 1 tablet (10 mg total) by mouth once daily.  Dispense: 90 tablet; Refill: 1    2. Seasonal allergic rhinitis due to other allergic trigger  -     ipratropium (ATROVENT) 21 mcg (0.03 %) nasal spray; 2 sprays by Each Nostril route 2 (two) times daily.  Dispense: 30 mL; Refill: 0  -     loratadine (CLARITIN) 10 mg tablet; Take 1 tablet (10 mg total) by mouth once daily.  Dispense: 90 tablet; Refill: 1    3. Chronic obstructive pulmonary disease, unspecified COPD type  Overview:  Albuterol 1 puff every 4 hours as needed    Orders:  -     albuterol (PROVENTIL/VENTOLIN HFA) 90 mcg/actuation inhaler; Inhale 1 puff into the lungs every 4 (four) hours as needed for Shortness of Breath.   Dispense: 18 g; Refill: 3    4. Osteoarthritis of multiple joints, unspecified osteoarthritis type  Overview:  Acetaminophen 650 mg every 6 hours as needed   Naproxen 500 mg b.i.d. as needed   Lidocaine 4% cream to painful joints as directed    Orders:  -     naproxen (NAPROSYN) 500 MG tablet; Take 1 tablet (500 mg total) by mouth 2 (two) times daily as needed (pain).  Dispense: 60 tablet; Refill: 2  -     LIDOcaine (LMX) 4 % cream; Apply topically as needed (shoulder).  Dispense: 28 g; Refill: 2    5. Cardiac arrhythmia, unspecified cardiac arrhythmia type    The patient will return to the clinic for an EKG.    Visit today included increased complexity associated with managing the longitudinal care of the patient due to the serious and/or complex managed problem(s) of  hypertension, allergic rhinitis, COPD, and arthritis.     Chronic conditions status updated as per HPI.  Other than changes above, cont current medications and maintain follow up with specialists.  Return to clinic in 3 month(s) for medication refills.    Karla Mead DO  [facility]

## 2024-06-28 ENCOUNTER — TELEPHONE (OUTPATIENT)
Dept: GASTROENTEROLOGY | Facility: CLINIC | Age: 74
End: 2024-06-28
Payer: COMMERCIAL

## 2024-06-28 NOTE — TELEPHONE ENCOUNTER
----- Message from SKY De Jesus sent at 6/27/2024  4:32 PM CDT -----  Plan to treat with Epclusa once imaging has resulted

## 2024-07-01 DIAGNOSIS — I49.9 IRREGULAR HEART RHYTHM: Primary | ICD-10-CM

## 2024-07-01 NOTE — PROGRESS NOTES
Notified pt to go to the Hospital just for an EKG since our machine is still down. Patient voiced understanding.

## 2024-07-02 ENCOUNTER — HOSPITAL ENCOUNTER (OUTPATIENT)
Dept: RADIOLOGY | Facility: HOSPITAL | Age: 74
Discharge: HOME | End: 2024-07-02
Payer: COMMERCIAL

## 2024-07-02 ENCOUNTER — TELEPHONE (OUTPATIENT)
Dept: GASTROENTEROLOGY | Facility: CLINIC | Age: 74
End: 2024-07-02
Payer: COMMERCIAL

## 2024-07-02 DIAGNOSIS — B19.20 HEPATITIS C TEST POSITIVE: ICD-10-CM

## 2024-07-02 PROCEDURE — 76705 ECHO EXAM OF ABDOMEN: CPT | Mod: 26,,, | Performed by: RADIOLOGY

## 2024-07-02 PROCEDURE — 76981 USE PARENCHYMA: CPT | Mod: TC

## 2024-07-02 PROCEDURE — 76981 USE PARENCHYMA: CPT | Mod: 26,,, | Performed by: RADIOLOGY

## 2024-07-02 PROCEDURE — 76705 ECHO EXAM OF ABDOMEN: CPT | Mod: TC

## 2024-07-03 NOTE — PROGRESS NOTES
Gastroenterology Clinic Note    Patient ID: [id number]   Referring MD: Karla Mead DO   Chief Complaint: No chief complaint on file.      History of Present Illness   Jeff Starr is an 74 y.o. AAM who is referred for hepatitis-C.  Patient recently diagnosed with chronic HCV by PCP.  Denies any known history of hepatitis or liver disease.  He has no GI related complaints on exam today.  Denies use of alcohol or illicit drugs.  No prior treatment for HCV.  He presented to clinic prepped for colonoscopy that he thought was rescheduled to today.    Review of Systems   Constitutional:  Negative for weight loss.   Gastrointestinal:  Negative for abdominal pain, blood in stool, constipation, diarrhea, heartburn, melena, nausea and vomiting.       Past Medical History      Past Medical History:   Diagnosis Date    Chronic pain syndrome 04/08/2023    COPD (chronic obstructive pulmonary disease)     COVID     HTN (hypertension)     Localized chronic periodontitis 03/21/2023    Tobacco user 03/21/2023       Past Surgical History     Past Surgical History:   Procedure Laterality Date    HERNIA REPAIR      SHOULDER SURGERY Right        Allergies   Review of patient's allergies indicates:  No Known Allergies    Immunization History     Immunization History   Administered Date(s) Administered    COVID-19 MRNA, LN-S PF (MODERNA HALF 0.25 ML DOSE) 11/04/2021, 06/16/2022    COVID-19, MRNA, LN-S, PF (MODERNA FULL 0.5 ML DOSE) 01/20/2021, 02/26/2021    Influenza (FLUAD) - Quadrivalent - Adjuvanted - PF *Preferred* (65+) 11/16/2022    Pneumococcal Conjugate - 20 Valent 11/16/2022    Tdap 07/13/2015       Past Family History      Family History   Problem Relation Name Age of Onset    Hypertension Mother      Diabetes Father      Hypertension Father      Coronary artery disease Father      Heart attack Father      Asthma Sister      Hypertension Sister         Past Social History      Social History     Socioeconomic History     Marital status:    Tobacco Use    Smoking status: Every Day     Current packs/day: 0.50     Average packs/day: 0.5 packs/day for 53.5 years (26.8 ttl pk-yrs)     Types: Cigarettes     Start date: 1971     Passive exposure: Current    Smokeless tobacco: Never   Substance and Sexual Activity    Alcohol use: Not Currently    Drug use: Yes     Types: Marijuana     Comment: occasional per pain    Sexual activity: Yes     Social Determinants of Health     Financial Resource Strain: Low Risk  (11/3/2023)    Overall Financial Resource Strain (CARDIA)     Difficulty of Paying Living Expenses: Not hard at all   Food Insecurity: No Food Insecurity (11/3/2023)    Hunger Vital Sign     Worried About Running Out of Food in the Last Year: Never true     Ran Out of Food in the Last Year: Never true   Transportation Needs: No Transportation Needs (11/3/2023)    PRAPARE - Transportation     Lack of Transportation (Medical): No     Lack of Transportation (Non-Medical): No   Physical Activity: Inactive (11/3/2023)    Exercise Vital Sign     Days of Exercise per Week: 0 days     Minutes of Exercise per Session: 0 min   Stress: No Stress Concern Present (11/3/2023)    [employer] - Occupational Stress Questionnaire     Feeling of Stress : Not at all   Housing Stability: Low Risk  (11/3/2023)    Housing Stability Vital Sign     Unable to Pay for Housing in the Last Year: No     Number of Places Lived in the Last Year: 1     Unstable Housing in the Last Year: No       Current Medications     No outpatient medications have been marked as taking for the 6/17/24 encounter (Office Visit) with Ada Tierney FNP.        I have reviewed the current medications, allergies, vital signs, past medical and surgical history, family medical history, and social history for this encounter and agree with all findings.    OBJECTIVE    Physical Exam    There were no vitals taken for this visit.  GEN: Well appearing,  cooperative, NAD  NECK: Supple, no LAD  CV: Normal rate  RESP: Unlabored  ABD: ND, NT, soft, no guarding  EXT: No clubbing, cyanosis, or edema  SKIN: Warm and dry  NEURO: AAO x4.     LABS    CBC (with or without Differential):   Lab Results   Component Value Date    WBC 4.07 (L) 06/18/2024    HGB 13.2 (L) 06/18/2024    HCT 39.7 (L) 06/18/2024    MCV 97.8 (H) 06/18/2024    MCH 32.5 (H) 06/18/2024    MCHC 33.2 06/18/2024    RDW 11.7 06/18/2024     (L) 06/18/2024    MPV 10.9 06/18/2024    NEUTOPHILPCT 33.3 (L) 06/18/2024    DIFFTYPE Auto 06/18/2024     BMP/CMP:   Lab Results   Component Value Date     06/18/2024    K 3.7 06/18/2024     (H) 06/18/2024    CO2 23 06/18/2024    BUN 14 06/18/2024    CREATININE 1.16 06/18/2024     (H) 06/18/2024    CALCIUM 8.7 06/18/2024    ALBUMIN 3.4 (L) 06/18/2024    AST 55 (H) 06/18/2024    ALT 55 06/18/2024    ALKPHOS 104 06/18/2024    MG 2.2 03/11/2020        IMAGING  No pertinent imaging available.    ASSESSMENT  Jeff Starr is a 74 y.o. AAM with history of COPD and hypertension who is referred for hepatitis-C.    1. Hepatitis C test positive           PLAN    - pretreatment labs and imaging  - plan to start Epclusa for chronic HCV  - risk versus benefit of Epclusa was discussed in detail with the patient in clinic today; side effects, adverse reactions, and the importance of compliance with therapy were discussed; patient in agreement to proceed with treatment  - colonoscopy for CRC screening    There are no Patient Instructions on file for this visit.      Orders Placed This Encounter   Procedures    US Elastography Liver w/imaging     Standing Status:   Future     Number of Occurrences:   1     Standing Expiration Date:   6/17/2025     Order Specific Question:   May the Radiologist modify the order per protocol to meet the clinical needs of the patient?     Answer:   Yes     Order Specific Question:   Release to patient     Answer:   Immediate    US  Abdomen Limited_Liver     Standing Status:   Future     Number of Occurrences:   1     Standing Expiration Date:   6/17/2025     Order Specific Question:   May the Radiologist modify the order per protocol to meet the clinical needs of the patient?     Answer:   Yes     Order Specific Question:   Release to patient     Answer:   Immediate    Hepatitis C Genotype     Standing Status:   Future     Number of Occurrences:   1     Standing Expiration Date:   8/16/2025    HIV 1/2 Ag/Ab (4th Gen)     Standing Status:   Future     Number of Occurrences:   1     Standing Expiration Date:   8/16/2025     Order Specific Question:   Release to patient     Answer:   Immediate    CBC Auto Differential     Standing Status:   Future     Number of Occurrences:   1     Standing Expiration Date:   8/16/2025    Comprehensive Metabolic Panel     Standing Status:   Future     Number of Occurrences:   1     Standing Expiration Date:   8/16/2025         The risks and benefits of my recommendations, as well as other treatment options were discussed with the patient today. All questions were answered.    45 minutes of total time spent on the encounter, which includes face to face time and non-face to face time preparing to see the patient (eg, review of tests), obtaining and/or reviewing separately obtained history, documenting clinical information in the electronic or other health record, Independently interpreting results (not separately reported) and communicating results to the patient/family/caregiver, or care coordination (not separately reported).          SHELLY De JesusP/ACNP  [facility]

## 2024-08-09 DIAGNOSIS — Z71.89 COMPLEX CARE COORDINATION: ICD-10-CM

## 2024-08-21 ENCOUNTER — OFFICE VISIT (OUTPATIENT)
Dept: FAMILY MEDICINE | Facility: CLINIC | Age: 74
End: 2024-08-21
Payer: COMMERCIAL

## 2024-08-21 VITALS
SYSTOLIC BLOOD PRESSURE: 132 MMHG | RESPIRATION RATE: 20 BRPM | OXYGEN SATURATION: 98 % | HEART RATE: 84 BPM | TEMPERATURE: 99 F | HEIGHT: 63 IN | BODY MASS INDEX: 25.34 KG/M2 | WEIGHT: 143 LBS | DIASTOLIC BLOOD PRESSURE: 78 MMHG

## 2024-08-21 DIAGNOSIS — U07.1 COVID-19 VIRUS DETECTED: ICD-10-CM

## 2024-08-21 DIAGNOSIS — Z20.828 EXPOSURE TO VIRAL DISEASE: ICD-10-CM

## 2024-08-21 DIAGNOSIS — U07.1 COVID: Primary | ICD-10-CM

## 2024-08-21 DIAGNOSIS — R06.02 SOB (SHORTNESS OF BREATH): ICD-10-CM

## 2024-08-21 LAB
CTP QC/QA: YES
CTP QC/QA: YES
POC MOLECULAR INFLUENZA A AGN: NEGATIVE
POC MOLECULAR INFLUENZA B AGN: NEGATIVE
SARS-COV-2 RDRP RESP QL NAA+PROBE: POSITIVE

## 2024-08-21 PROCEDURE — 1159F MED LIST DOCD IN RCRD: CPT | Mod: ,,, | Performed by: NURSE PRACTITIONER

## 2024-08-21 PROCEDURE — 3075F SYST BP GE 130 - 139MM HG: CPT | Mod: ,,, | Performed by: NURSE PRACTITIONER

## 2024-08-21 PROCEDURE — 87502 INFLUENZA DNA AMP PROBE: CPT | Mod: QW,,, | Performed by: NURSE PRACTITIONER

## 2024-08-21 PROCEDURE — 99214 OFFICE O/P EST MOD 30 MIN: CPT | Mod: 25,,, | Performed by: NURSE PRACTITIONER

## 2024-08-21 PROCEDURE — 3078F DIAST BP <80 MM HG: CPT | Mod: ,,, | Performed by: NURSE PRACTITIONER

## 2024-08-21 PROCEDURE — 1160F RVW MEDS BY RX/DR IN RCRD: CPT | Mod: ,,, | Performed by: NURSE PRACTITIONER

## 2024-08-21 PROCEDURE — 3008F BODY MASS INDEX DOCD: CPT | Mod: ,,, | Performed by: NURSE PRACTITIONER

## 2024-08-21 PROCEDURE — 96372 THER/PROPH/DIAG INJ SC/IM: CPT | Mod: ,,, | Performed by: NURSE PRACTITIONER

## 2024-08-21 PROCEDURE — 3288F FALL RISK ASSESSMENT DOCD: CPT | Mod: ,,, | Performed by: NURSE PRACTITIONER

## 2024-08-21 PROCEDURE — 87635 SARS-COV-2 COVID-19 AMP PRB: CPT | Mod: QW,,, | Performed by: NURSE PRACTITIONER

## 2024-08-21 PROCEDURE — 1101F PT FALLS ASSESS-DOCD LE1/YR: CPT | Mod: ,,, | Performed by: NURSE PRACTITIONER

## 2024-08-21 RX ORDER — DEXAMETHASONE SODIUM PHOSPHATE 4 MG/ML
4 INJECTION, SOLUTION INTRA-ARTICULAR; INTRALESIONAL; INTRAMUSCULAR; INTRAVENOUS; SOFT TISSUE
Status: COMPLETED | OUTPATIENT
Start: 2024-08-21 | End: 2024-08-21

## 2024-08-21 RX ORDER — ALBUTEROL SULFATE 0.83 MG/ML
2.5 SOLUTION RESPIRATORY (INHALATION) EVERY 6 HOURS PRN
Qty: 60 ML | Refills: 5 | Status: SHIPPED | OUTPATIENT
Start: 2024-08-21 | End: 2025-08-21

## 2024-08-21 RX ORDER — NIRMATRELVIR AND RITONAVIR 300-100 MG
KIT ORAL
Qty: 30 TABLET | Refills: 0 | Status: SHIPPED | OUTPATIENT
Start: 2024-08-21

## 2024-08-21 RX ADMIN — DEXAMETHASONE SODIUM PHOSPHATE 4 MG: 4 INJECTION, SOLUTION INTRA-ARTICULAR; INTRALESIONAL; INTRAMUSCULAR; INTRAVENOUS; SOFT TISSUE at 07:08

## 2024-08-21 NOTE — PROGRESS NOTES
"Subjective:       Patient ID: Jeff Starr is a 74 y.o. male.    Chief Complaint: Cough (X symptoms started yesterday), Shortness of Breath, Chills, Night Sweats, Generalized Body Aches, and Dizziness    Presents to clinic as above.  S/S started yesterday. Has had some SOB. Hx of smoking and emphysema.     Cough  Associated symptoms include chills and shortness of breath. Pertinent negatives include no fever.   Shortness of Breath  Pertinent negatives include no fever.   Dizziness: no fever.    Review of Systems   Constitutional:  Positive for chills and malaise/fatigue. Negative for fever.   HENT:  Positive for congestion.    Respiratory:  Positive for cough and shortness of breath.    Cardiovascular: Negative.    Gastrointestinal: Negative.    Neurological:  Positive for dizziness.          Reviewed family, medical, surgical, and social history.    Objective:      /78 (BP Location: Left arm, Patient Position: Sitting, BP Method: Medium (Automatic))   Pulse 84   Temp 99.4 °F (37.4 °C) (Oral)   Resp 20   Ht 5' 3" (1.6 m)   Wt 64.9 kg (143 lb)   SpO2 98%   BMI 25.33 kg/m²   Physical Exam  Vitals and nursing note reviewed.   Constitutional:       General: He is not in acute distress.     Appearance: Normal appearance. He is normal weight. He is not ill-appearing, toxic-appearing or diaphoretic.   HENT:      Head: Normocephalic.      Right Ear: Hearing, tympanic membrane, ear canal and external ear normal.      Left Ear: Hearing, tympanic membrane, ear canal and external ear normal.      Nose: Mucosal edema, congestion and rhinorrhea present. Rhinorrhea is clear.      Right Turbinates: Enlarged and swollen.      Left Turbinates: Enlarged and swollen.      Right Sinus: No maxillary sinus tenderness or frontal sinus tenderness.      Left Sinus: No maxillary sinus tenderness or frontal sinus tenderness.      Mouth/Throat:      Lips: Pink.      Mouth: Mucous membranes are moist.      Pharynx: Uvula " midline. No pharyngeal swelling, oropharyngeal exudate, posterior oropharyngeal erythema or uvula swelling.      Tonsils: No tonsillar exudate or tonsillar abscesses.   Cardiovascular:      Rate and Rhythm: Normal rate and regular rhythm.      Heart sounds: Normal heart sounds.   Pulmonary:      Effort: Pulmonary effort is normal. No respiratory distress.      Breath sounds: No stridor. Wheezing present. No rhonchi or rales.      Comments: Mild expiratory wheezes.   Chest:      Chest wall: No tenderness.   Musculoskeletal:      Cervical back: Normal range of motion and neck supple. No rigidity or tenderness.   Lymphadenopathy:      Cervical: No cervical adenopathy.   Skin:     General: Skin is warm and dry.   Neurological:      Mental Status: He is alert.   Psychiatric:         Mood and Affect: Mood normal.         Behavior: Behavior normal.         Thought Content: Thought content normal.         Judgment: Judgment normal.            Office Visit on 08/21/2024   Component Date Value Ref Range Status    POC Rapid COVID 08/21/2024 Positive (A)  Negative Final     Acceptable 08/21/2024 Yes   Final    POC Molecular Influenza A Ag 08/21/2024 Negative  Negative Final    POC Molecular Influenza B Ag 08/21/2024 Negative  Negative Final     Acceptable 08/21/2024 Yes   Final      Assessment:       1. COVID    2. Exposure to viral disease    3. SOB (shortness of breath)        Plan:       COVID  -     X-Ray Chest PA And Lateral; Future; Expected date: 08/21/2024  -     nirmatrelvir-ritonavir (PAXLOVID) 300 mg (150 mg x 2)-100 mg copackaged tablets (EUA); Take 3 tablets by mouth 2 (two) times daily. Each dose contains 2 nirmatrelvir (pink tablets) and 1 ritonavir (white tablet). Take all 3 tablets together  Dispense: 30 tablet; Refill: 0    Exposure to viral disease  -     POCT COVID-19 Rapid Screening  -     POCT Influenza A/B Molecular    SOB (shortness of breath)  -     X-Ray Chest PA And  Lateral; Future; Expected date: 08/21/2024  -     albuterol (PROVENTIL) 2.5 mg /3 mL (0.083 %) nebulizer solution; Take 3 mLs (2.5 mg total) by nebulization every 6 (six) hours as needed for Wheezing or Shortness of Breath. Rescue  Dispense: 60 mL; Refill: 5  -     dexAMETHasone injection 4 mg    Improvement in SOB after nebs tx.  Quarantine for 5 days  OTC meds for symptomatic relief  Offered Paxlovid. Discussed Risks and benefits. desires  Drink plenty of fluids  Go to ER with trouble breathing or worsening  Copy of result and work/school note given  Return to clinic as needed            Risks, benefits, and side effects were discussed with the patient. All questions were answered to the fullest satisfaction of the patient, and pt verbalized understanding and agreement to treatment plan. Pt was to call with any new or worsening symptoms, or present to the ER.

## 2024-08-22 DIAGNOSIS — B18.2 CHRONIC HEPATITIS C WITHOUT HEPATIC COMA: ICD-10-CM

## 2024-08-22 DIAGNOSIS — B19.20 HEPATITIS C TEST POSITIVE: Primary | ICD-10-CM

## 2024-08-22 RX ORDER — VELPATASVIR AND SOFOSBUVIR 100; 400 MG/1; MG/1
1 TABLET, FILM COATED ORAL DAILY
Qty: 84 TABLET | Refills: 0 | OUTPATIENT
Start: 2024-08-22 | End: 2024-11-14

## 2024-08-22 NOTE — PATIENT INSTRUCTIONS
Quarantine for 5 days  OTC meds for symptomatic relief  Offered Paxlovid. Discussed Risks and benefits. desires  Drink plenty of fluids  Go to ER with trouble breathing or worsening  Copy of result and work/school note given  Return to clinic as needed

## 2024-08-23 PROBLEM — B18.2 CHRONIC HEPATITIS C WITH HEPATIC COMA: Status: ACTIVE | Noted: 2024-08-23

## 2024-08-23 PROBLEM — B19.20 HEPATITIS C: Status: ACTIVE | Noted: 2024-08-23

## 2024-09-17 ENCOUNTER — OFFICE VISIT (OUTPATIENT)
Dept: FAMILY MEDICINE | Facility: CLINIC | Age: 74
End: 2024-09-17
Payer: COMMERCIAL

## 2024-09-17 VITALS
SYSTOLIC BLOOD PRESSURE: 132 MMHG | WEIGHT: 143.31 LBS | OXYGEN SATURATION: 96 % | BODY MASS INDEX: 25.39 KG/M2 | HEART RATE: 72 BPM | RESPIRATION RATE: 20 BRPM | TEMPERATURE: 98 F | HEIGHT: 63 IN | DIASTOLIC BLOOD PRESSURE: 76 MMHG

## 2024-09-17 DIAGNOSIS — M15.9 OSTEOARTHRITIS OF MULTIPLE JOINTS, UNSPECIFIED OSTEOARTHRITIS TYPE: ICD-10-CM

## 2024-09-17 DIAGNOSIS — I10 PRIMARY HYPERTENSION: Primary | ICD-10-CM

## 2024-09-17 DIAGNOSIS — J44.9 CHRONIC OBSTRUCTIVE PULMONARY DISEASE, UNSPECIFIED COPD TYPE: ICD-10-CM

## 2024-09-17 DIAGNOSIS — J30.89 SEASONAL ALLERGIC RHINITIS DUE TO OTHER ALLERGIC TRIGGER: ICD-10-CM

## 2024-09-17 PROCEDURE — 3008F BODY MASS INDEX DOCD: CPT | Mod: ,,, | Performed by: FAMILY MEDICINE

## 2024-09-17 PROCEDURE — G2211 COMPLEX E/M VISIT ADD ON: HCPCS | Mod: ,,, | Performed by: FAMILY MEDICINE

## 2024-09-17 PROCEDURE — 99214 OFFICE O/P EST MOD 30 MIN: CPT | Mod: ,,, | Performed by: FAMILY MEDICINE

## 2024-09-17 PROCEDURE — 3078F DIAST BP <80 MM HG: CPT | Mod: ,,, | Performed by: FAMILY MEDICINE

## 2024-09-17 PROCEDURE — 3075F SYST BP GE 130 - 139MM HG: CPT | Mod: ,,, | Performed by: FAMILY MEDICINE

## 2024-09-17 RX ORDER — AMLODIPINE BESYLATE 10 MG/1
10 TABLET ORAL DAILY
Qty: 90 TABLET | Refills: 1 | Status: SHIPPED | OUTPATIENT
Start: 2024-09-17

## 2024-09-17 RX ORDER — LIDOCAINE 40 MG/G
CREAM TOPICAL
Qty: 28 G | Refills: 2 | Status: SHIPPED | OUTPATIENT
Start: 2024-09-17

## 2024-09-17 RX ORDER — NAPROXEN 500 MG/1
500 TABLET ORAL 2 TIMES DAILY PRN
Qty: 60 TABLET | Refills: 2 | Status: SHIPPED | OUTPATIENT
Start: 2024-09-17

## 2024-09-17 RX ORDER — IPRATROPIUM BROMIDE 21 UG/1
2 SPRAY, METERED NASAL 2 TIMES DAILY
Qty: 30 ML | Refills: 3 | Status: SHIPPED | OUTPATIENT
Start: 2024-09-17

## 2024-09-17 RX ORDER — ALBUTEROL SULFATE 90 UG/1
1 INHALANT RESPIRATORY (INHALATION) EVERY 4 HOURS PRN
Qty: 18 G | Refills: 3 | Status: SHIPPED | OUTPATIENT
Start: 2024-09-17

## 2024-09-17 NOTE — PROGRESS NOTES
[facility] Medicine    Chief Complaint      Chief Complaint   Patient presents with    Follow-up    Hypertension     3 month follow up/med refills       History of Present Illness      Jeff Starr is a 74 y.o. male that  has a past medical history of Chronic pain syndrome, COPD (chronic obstructive pulmonary disease), COVID, HTN (hypertension), Localized chronic periodontitis, and Tobacco user.     HPI    The patient presents today for a three month follow up visit for hypertension.He has no complaints        Past Medical History:  Past Medical History:   Diagnosis Date    Chronic pain syndrome 04/08/2023    COPD (chronic obstructive pulmonary disease)     COVID     HTN (hypertension)     Localized chronic periodontitis 03/21/2023    Tobacco user 03/21/2023       Past Surgical History:   has a past surgical history that includes Shoulder surgery (Right) and Hernia repair.    Social History:  Social History     Tobacco Use    Smoking status: Every Day     Current packs/day: 0.50     Average packs/day: 0.5 packs/day for 53.7 years (26.9 ttl pk-yrs)     Types: Cigarettes     Start date: 1971     Passive exposure: Current    Smokeless tobacco: Never   Substance Use Topics    Alcohol use: Not Currently    Drug use: Yes     Types: Marijuana     Comment: occasional per pain       I personally reviewed all past medical, surgical, and social.     Review of Systems   Constitutional:  Negative for chills and fever.   HENT:  Negative for ear pain and sore throat.    Eyes:  Negative for blurred vision.   Respiratory:  Negative for cough and shortness of breath.    Cardiovascular:  Negative for chest pain and palpitations.   Gastrointestinal:  Negative for abdominal pain and constipation.   Genitourinary:  Negative for dysuria and hematuria.   Musculoskeletal:  Negative for back pain and falls.   Skin:  Negative for itching and rash.   Neurological:  Negative for weakness and headaches.   Endo/Heme/Allergies:  Negative for  polydipsia. Does not bruise/bleed easily.   Psychiatric/Behavioral:  Negative for suicidal ideas. The patient does not have insomnia.         Medications:  Outpatient Encounter Medications as of 9/17/2024   Medication Sig Note Dispense Refill    acetaminophen (TYLENOL) 650 MG TbSR Tylenol Arthritis Pain       albuterol (PROVENTIL) 2.5 mg /3 mL (0.083 %) nebulizer solution Take 3 mLs (2.5 mg total) by nebulization every 6 (six) hours as needed for Wheezing or Shortness of Breath. Rescue  60 mL 5    loratadine (CLARITIN) 10 mg tablet Take 1 tablet (10 mg total) by mouth once daily. 8/26/2024: Has not needed in a while.  90 tablet 1    nirmatrelvir-ritonavir (PAXLOVID) 300 mg (150 mg x 2)-100 mg copackaged tablets (EUA) Take 3 tablets by mouth 2 (two) times daily. Each dose contains 2 nirmatrelvir (pink tablets) and 1 ritonavir (white tablet). Take all 3 tablets together  30 tablet 0    sofosbuvir-velpatasvir 400-100 mg Tab Take 1 tablet by mouth once daily. for 84 doses  84 tablet 0    [DISCONTINUED] albuterol (PROVENTIL/VENTOLIN HFA) 90 mcg/actuation inhaler Inhale 1 puff into the lungs every 4 (four) hours as needed for Shortness of Breath.  18 g 3    [DISCONTINUED] amLODIPine (NORVASC) 10 MG tablet Take 1 tablet (10 mg total) by mouth once daily.  90 tablet 1    [DISCONTINUED] ipratropium (ATROVENT) 21 mcg (0.03 %) nasal spray 2 sprays by Each Nostril route 2 (two) times daily.  30 mL 0    [DISCONTINUED] LIDOcaine (LMX) 4 % cream Apply topically as needed (shoulder).  28 g 2    [DISCONTINUED] naproxen (NAPROSYN) 500 MG tablet Take 1 tablet (500 mg total) by mouth 2 (two) times daily as needed (pain).  60 tablet 2    albuterol (PROVENTIL/VENTOLIN HFA) 90 mcg/actuation inhaler Inhale 1 puff into the lungs every 4 (four) hours as needed for Shortness of Breath.  18 g 3    amLODIPine (NORVASC) 10 MG tablet Take 1 tablet (10 mg total) by mouth once daily.  90 tablet 1    ipratropium (ATROVENT) 21 mcg (0.03 %) nasal  "spray 2 sprays by Each Nostril route 2 (two) times daily.  30 mL 3    LIDOcaine (LMX) 4 % cream Apply topically as needed (shoulder).  28 g 2    naproxen (NAPROSYN) 500 MG tablet Take 1 tablet (500 mg total) by mouth 2 (two) times daily as needed (pain).  60 tablet 2     No facility-administered encounter medications on file as of 9/17/2024.       Allergies:  Review of patient's allergies indicates:  No Known Allergies    Health Maintenance:  Immunization History   Administered Date(s) Administered    COVID-19 MRNA, LN-S PF (MODERNA HALF 0.25 ML DOSE) 11/04/2021, 06/16/2022    COVID-19, MRNA, LN-S, PF (MODERNA FULL 0.5 ML DOSE) 01/20/2021, 02/26/2021    Influenza (FLUAD) - Quadrivalent - Adjuvanted - PF *Preferred* (65+) 11/16/2022    Pneumococcal Conjugate - 20 Valent 11/16/2022    Tdap 07/13/2015      Health Maintenance   Topic Date Due    Shingles Vaccine (1 of 2) Never done    LDCT Lung Screen  02/06/2025    TETANUS VACCINE  07/13/2025    Colorectal Cancer Screening  06/17/2027    Lipid Panel  12/12/2028    Hepatitis C Screening  Completed    Abdominal Aortic Aneurysm Screening  Completed        Physical Exam      Vital Signs  Temp: 98.3 °F (36.8 °C)  Pulse: 72  Resp: 20  SpO2: 96 %  BP: 132/76  Height and Weight  Height: 5' 3" (160 cm)  Weight: 65 kg (143 lb 4.8 oz)  BSA (Calculated - sq m): 1.7 sq meters  BMI (Calculated): 25.4  Weight in (lb) to have BMI = 25: 140.8]    Physical Exam  Vitals and nursing note reviewed.   Constitutional:       Appearance: Normal appearance.   HENT:      Head: Normocephalic and atraumatic.      Nose: Nose normal.   Eyes:      Extraocular Movements: Extraocular movements intact.      Conjunctiva/sclera: Conjunctivae normal.      Pupils: Pupils are equal, round, and reactive to light.   Cardiovascular:      Rate and Rhythm: Normal rate and regular rhythm.      Heart sounds: Normal heart sounds.   Pulmonary:      Effort: Pulmonary effort is normal.      Breath sounds: Normal breath " sounds.   Musculoskeletal:      Right lower leg: No edema.      Left lower leg: No edema.   Skin:     Findings: No rash.   Neurological:      General: No focal deficit present.      Mental Status: He is alert and oriented to person, place, and time. Mental status is at baseline.      Cranial Nerves: No cranial nerve deficit.      Gait: Gait normal.   Psychiatric:         Mood and Affect: Mood normal.         Behavior: Behavior normal.         Thought Content: Thought content normal.         Judgment: Judgment normal.          Laboratory:  CBC:  Recent Labs   Lab 04/14/22  0854 06/18/24  1035   WBC 5.39 4.07 L   RBC 4.01 L 4.06 L   Hemoglobin 13.1 L 13.2 L   Hematocrit 40.2 39.7 L   Platelet Count 144 L 129 L   .2 H 97.8 H   MCH 32.7 H 32.5 H   MCHC 32.6 33.2     CMP:  Recent Labs   Lab 12/12/23  1620 03/20/24  1329 06/18/24  1035   Glucose 93 101 118 H   Calcium 9.2 9.2 8.7   Albumin 3.6 4.0 3.4 L   Total Protein 7.6 7.4 7.2   Sodium 141 142 140   Potassium 4.0 3.9 3.7   CO2 29 28 23   Chloride 109 H 111 H 111 H   BUN 9 10 14   Alk Phos 91 86 104   ALT 47 50 55   AST 43 H 39 H 55 H   Bilirubin, Total 0.9 1.2 0.5     LIPIDS:  Recent Labs   Lab 04/14/22  0854 12/12/23  1620   HDL Cholesterol 38 L 44   Cholesterol 151 146   Triglycerides 79 66   LDL Calculated 97 89   Cholesterol/HDL Ratio (Risk Factor) 4.0 3.3   Non- 102     TSH:      A1C:  Recent Labs   Lab 03/21/23  1531   Hemoglobin A1C 5.1       Assessment/Plan     Jeff Starr is a 74 y.o.male with:    1. Primary hypertension  Overview:  Goal BP less than 140/80   Amlodipine 10 mg oral daily    Orders:  -     amLODIPine (NORVASC) 10 MG tablet; Take 1 tablet (10 mg total) by mouth once daily.  Dispense: 90 tablet; Refill: 1    2. Seasonal allergic rhinitis due to other allergic trigger  -     ipratropium (ATROVENT) 21 mcg (0.03 %) nasal spray; 2 sprays by Each Nostril route 2 (two) times daily.  Dispense: 30 mL; Refill: 3    3. Chronic  obstructive pulmonary disease, unspecified COPD type  Overview:  Albuterol 1 puff every 4 hours as needed    Orders:  -     albuterol (PROVENTIL/VENTOLIN HFA) 90 mcg/actuation inhaler; Inhale 1 puff into the lungs every 4 (four) hours as needed for Shortness of Breath.  Dispense: 18 g; Refill: 3    4. Osteoarthritis of multiple joints, unspecified osteoarthritis type  Overview:  Acetaminophen 650 mg every 6 hours as needed   Naproxen 500 mg b.i.d. as needed   Lidocaine 4% cream to painful joints as directed    Orders:  -     LIDOcaine (LMX) 4 % cream; Apply topically as needed (shoulder).  Dispense: 28 g; Refill: 2  -     naproxen (NAPROSYN) 500 MG tablet; Take 1 tablet (500 mg total) by mouth 2 (two) times daily as needed (pain).  Dispense: 60 tablet; Refill: 2        Visit today included increased complexity associated with managing the longitudinal care of the patient due to the serious and/or complex managed problem(s) of  hypertension, COPD, arthritis, and allergic rhinitis.     Chronic conditions status updated as per HPI.  Other than changes above, cont current medications and maintain follow up with specialists.  Return to clinic in 3 month(s) for medication refills.    Karla Mead DO  [facility]

## 2024-09-18 ENCOUNTER — OFFICE VISIT (OUTPATIENT)
Dept: GASTROENTEROLOGY | Facility: CLINIC | Age: 74
End: 2024-09-18
Payer: COMMERCIAL

## 2024-09-18 VITALS
HEART RATE: 69 BPM | SYSTOLIC BLOOD PRESSURE: 121 MMHG | DIASTOLIC BLOOD PRESSURE: 80 MMHG | WEIGHT: 141.19 LBS | HEIGHT: 63 IN | BODY MASS INDEX: 25.02 KG/M2

## 2024-09-18 DIAGNOSIS — B18.2 CHRONIC HEPATITIS C WITHOUT HEPATIC COMA: Primary | ICD-10-CM

## 2024-09-18 DIAGNOSIS — B19.20 HEPATITIS C TEST POSITIVE: ICD-10-CM

## 2024-09-18 PROCEDURE — 99214 OFFICE O/P EST MOD 30 MIN: CPT | Mod: S$PBB,,,

## 2024-09-18 PROCEDURE — 99999 PR PBB SHADOW E&M-EST. PATIENT-LVL IV: CPT | Mod: PBBFAC,,,

## 2024-09-18 PROCEDURE — 1101F PT FALLS ASSESS-DOCD LE1/YR: CPT | Mod: CPTII,,,

## 2024-09-18 PROCEDURE — 99214 OFFICE O/P EST MOD 30 MIN: CPT | Mod: PBBFAC

## 2024-09-18 PROCEDURE — 3074F SYST BP LT 130 MM HG: CPT | Mod: CPTII,,,

## 2024-09-18 PROCEDURE — 1160F RVW MEDS BY RX/DR IN RCRD: CPT | Mod: CPTII,,,

## 2024-09-18 PROCEDURE — 1159F MED LIST DOCD IN RCRD: CPT | Mod: CPTII,,,

## 2024-09-18 PROCEDURE — 3288F FALL RISK ASSESSMENT DOCD: CPT | Mod: CPTII,,,

## 2024-09-18 PROCEDURE — 3008F BODY MASS INDEX DOCD: CPT | Mod: CPTII,,,

## 2024-09-18 PROCEDURE — 3079F DIAST BP 80-89 MM HG: CPT | Mod: CPTII,,,

## 2024-09-23 ENCOUNTER — TELEPHONE (OUTPATIENT)
Dept: GASTROENTEROLOGY | Facility: CLINIC | Age: 74
End: 2024-09-23
Payer: COMMERCIAL

## 2024-09-23 NOTE — TELEPHONE ENCOUNTER
----- Message from SKY De Jesus sent at 9/19/2024  2:30 PM CDT -----  Labs are okay.  Complete Epclusa therapy as prescribed

## 2024-10-06 NOTE — PROGRESS NOTES
Gastroenterology Clinic Note    Patient ID: [id number]   Referring MD: No ref. provider found   Chief Complaint:   Chief Complaint   Patient presents with    Follow-up     No problems - has started meds and second part will arrive Friday       History of Present Illness   Jeff Starr is an 74 y.o. AAM who is referred for hepatitis-C.  Patient recently diagnosed with chronic HCV by PCP.  Denies any known history of hepatitis or liver disease.  He has no GI related complaints on exam today.  Denies use of alcohol or illicit drugs.  No prior treatment for HCV.  He presented to clinic prepped for colonoscopy that he thought was rescheduled to today.    Previous workup:  Ultrasound abdomen limited liver and elastography    Last colonoscopy was 06/17/2024 with 3 year recall for screening purposes.    Interval  - Epclusa therapy was started 08/2024  - he is tolerating treatment well without any issues reported    Review of Systems   Constitutional:  Negative for weight loss.   Gastrointestinal:  Negative for abdominal pain, blood in stool, constipation, diarrhea, heartburn, melena, nausea and vomiting.       Past Medical History      Past Medical History:   Diagnosis Date    Chronic pain syndrome 04/08/2023    COPD (chronic obstructive pulmonary disease)     COVID     HTN (hypertension)     Localized chronic periodontitis 03/21/2023    Tobacco user 03/21/2023       Past Surgical History     Past Surgical History:   Procedure Laterality Date    HERNIA REPAIR      SHOULDER SURGERY Right        Allergies   Review of patient's allergies indicates:  No Known Allergies    Immunization History     Immunization History   Administered Date(s) Administered    COVID-19 MRNA, LN-S PF (MODERNA HALF 0.25 ML DOSE) 11/04/2021, 06/16/2022    COVID-19, MRNA, LN-S, PF (MODERNA FULL 0.5 ML DOSE) 01/20/2021, 02/26/2021    Influenza (FLUAD) - Quadrivalent - Adjuvanted - PF *Preferred* (65+) 11/16/2022    Pneumococcal Conjugate - 20 Valent  11/16/2022    Tdap 07/13/2015       Past Family History      Family History   Problem Relation Name Age of Onset    Hypertension Mother      Diabetes Father      Hypertension Father      Coronary artery disease Father      Heart attack Father      Asthma Sister      Hypertension Sister         Past Social History      Social History     Socioeconomic History    Marital status:    Tobacco Use    Smoking status: Every Day     Current packs/day: 0.50     Average packs/day: 0.5 packs/day for 53.8 years (26.9 ttl pk-yrs)     Types: Cigarettes     Start date: 1971     Passive exposure: Current    Smokeless tobacco: Never   Substance and Sexual Activity    Alcohol use: Not Currently    Drug use: Yes     Types: Marijuana     Comment: occasional per pain    Sexual activity: Yes     Social Drivers of Health     Financial Resource Strain: Low Risk  (11/3/2023)    Overall Financial Resource Strain (CARDIA)     Difficulty of Paying Living Expenses: Not hard at all   Food Insecurity: No Food Insecurity (11/3/2023)    Hunger Vital Sign     Worried About Running Out of Food in the Last Year: Never true     Ran Out of Food in the Last Year: Never true   Transportation Needs: No Transportation Needs (11/3/2023)    PRAPARE - Transportation     Lack of Transportation (Medical): No     Lack of Transportation (Non-Medical): No   Physical Activity: Inactive (11/3/2023)    Exercise Vital Sign     Days of Exercise per Week: 0 days     Minutes of Exercise per Session: 0 min   Stress: No Stress Concern Present (11/3/2023)    [employer] - Occupational Stress Questionnaire     Feeling of Stress : Not at all   Housing Stability: Low Risk  (11/3/2023)    Housing Stability Vital Sign     Unable to Pay for Housing in the Last Year: No     Number of Places Lived in the Last Year: 1     Unstable Housing in the Last Year: No       Current Medications     Outpatient Medications Marked as Taking for the 9/18/24  "encounter (Office Visit) with Ada Tierney FNP   Medication Sig Dispense Refill    acetaminophen (TYLENOL) 650 MG TbSR Tylenol Arthritis Pain      albuterol (PROVENTIL) 2.5 mg /3 mL (0.083 %) nebulizer solution Take 3 mLs (2.5 mg total) by nebulization every 6 (six) hours as needed for Wheezing or Shortness of Breath. Rescue 60 mL 5    albuterol (PROVENTIL/VENTOLIN HFA) 90 mcg/actuation inhaler Inhale 1 puff into the lungs every 4 (four) hours as needed for Shortness of Breath. 18 g 3    amLODIPine (NORVASC) 10 MG tablet Take 1 tablet (10 mg total) by mouth once daily. 90 tablet 1    ipratropium (ATROVENT) 21 mcg (0.03 %) nasal spray 2 sprays by Each Nostril route 2 (two) times daily. 30 mL 3    LIDOcaine (LMX) 4 % cream Apply topically as needed (shoulder). 28 g 2    loratadine (CLARITIN) 10 mg tablet Take 1 tablet (10 mg total) by mouth once daily. 90 tablet 1    naproxen (NAPROSYN) 500 MG tablet Take 1 tablet (500 mg total) by mouth 2 (two) times daily as needed (pain). 60 tablet 2    nirmatrelvir-ritonavir (PAXLOVID) 300 mg (150 mg x 2)-100 mg copackaged tablets (EUA) Take 3 tablets by mouth 2 (two) times daily. Each dose contains 2 nirmatrelvir (pink tablets) and 1 ritonavir (white tablet). Take all 3 tablets together 30 tablet 0    sofosbuvir-velpatasvir 400-100 mg Tab Take 1 tablet by mouth once daily. for 84 doses 84 tablet 0        I have reviewed the current medications, allergies, vital signs, past medical and surgical history, family medical history, and social history for this encounter and agree with all findings.    OBJECTIVE    Physical Exam    /80   Pulse 69   Ht 5' 3" (1.6 m)   Wt 64 kg (141 lb 3.2 oz)   BMI 25.01 kg/m²   GEN: Well appearing, cooperative, NAD  NECK: Supple, no LAD  CV: Normal rate  RESP: Unlabored  ABD: ND, NT, soft, no guarding  EXT: No clubbing, cyanosis, or edema  SKIN: Warm and dry  NEURO: AAO x4.     LABS    CBC (with or without Differential):   Lab Results "   Component Value Date    WBC 4.07 (L) 06/18/2024    HGB 13.2 (L) 06/18/2024    HCT 39.7 (L) 06/18/2024    MCV 97.8 (H) 06/18/2024    MCH 32.5 (H) 06/18/2024    MCHC 33.2 06/18/2024    RDW 11.7 06/18/2024     (L) 06/18/2024    MPV 10.9 06/18/2024    NEUTOPHILPCT 33.3 (L) 06/18/2024    DIFFTYPE Auto 06/18/2024     BMP/CMP:   Lab Results   Component Value Date     09/18/2024    K 4.1 09/18/2024     09/18/2024    CO2 26 09/18/2024    BUN 17 09/18/2024    CREATININE 1.23 09/18/2024    GLU 92 09/18/2024    CALCIUM 8.9 09/18/2024    ALBUMIN 3.4 (L) 09/18/2024    AST 20 09/18/2024    ALT 18 09/18/2024    ALKPHOS 78 09/18/2024    MG 2.2 03/11/2020        IMAGING  Ultrasound abdomen limited liver 07/2024  - simple appearing right renal cyst; no other evidence of abnormality demonstrated    Ultrasound elastography liver 07/2024  - Metavir score F0-1    ASSESSMENT  Jeff Starr is a 74 y.o. AAM with history of COPD and hypertension who is referred for hepatitis-C.    1. Chronic hepatitis C without hepatic coma    2. Hepatitis C test positive           PLAN    - continue Epclusa as prescribed  - repeat CMP today  - plan for test of cure at follow-up    There are no Patient Instructions on file for this visit.      Orders Placed This Encounter   Procedures    Comprehensive Metabolic Panel     Standing Status:   Future     Number of Occurrences:   1     Standing Expiration Date:   12/17/2025         The risks and benefits of my recommendations, as well as other treatment options were discussed with the patient today. All questions were answered.    30 minutes of total time spent on the encounter, which includes face to face time and non-face to face time preparing to see the patient (eg, review of tests), obtaining and/or reviewing separately obtained history, documenting clinical information in the electronic or other health record, Independently interpreting results (not separately reported) and  communicating results to the patient/family/caregiver, or care coordination (not separately reported).          Ada Tierney, SHELLYP/ACNP  [facility]

## 2025-01-13 ENCOUNTER — OFFICE VISIT (OUTPATIENT)
Dept: FAMILY MEDICINE | Facility: CLINIC | Age: 75
End: 2025-01-13
Payer: MEDICARE

## 2025-01-13 VITALS
TEMPERATURE: 98 F | RESPIRATION RATE: 18 BRPM | HEART RATE: 77 BPM | SYSTOLIC BLOOD PRESSURE: 131 MMHG | HEIGHT: 63 IN | DIASTOLIC BLOOD PRESSURE: 78 MMHG | BODY MASS INDEX: 26.05 KG/M2 | WEIGHT: 147 LBS | OXYGEN SATURATION: 98 %

## 2025-01-13 DIAGNOSIS — G89.4 CHRONIC PAIN SYNDROME: Primary | Chronic | ICD-10-CM

## 2025-01-13 DIAGNOSIS — M15.9 OSTEOARTHRITIS OF MULTIPLE JOINTS, UNSPECIFIED OSTEOARTHRITIS TYPE: Chronic | ICD-10-CM

## 2025-01-13 DIAGNOSIS — Z71.6 TOBACCO ABUSE COUNSELING: ICD-10-CM

## 2025-01-13 PROCEDURE — 1159F MED LIST DOCD IN RCRD: CPT | Mod: ,,, | Performed by: FAMILY MEDICINE

## 2025-01-13 PROCEDURE — 1160F RVW MEDS BY RX/DR IN RCRD: CPT | Mod: ,,, | Performed by: FAMILY MEDICINE

## 2025-01-13 PROCEDURE — 99213 OFFICE O/P EST LOW 20 MIN: CPT | Mod: GE,,, | Performed by: FAMILY MEDICINE

## 2025-01-13 PROCEDURE — 3075F SYST BP GE 130 - 139MM HG: CPT | Mod: ,,, | Performed by: FAMILY MEDICINE

## 2025-01-13 PROCEDURE — 3078F DIAST BP <80 MM HG: CPT | Mod: ,,, | Performed by: FAMILY MEDICINE

## 2025-01-13 PROCEDURE — 3008F BODY MASS INDEX DOCD: CPT | Mod: ,,, | Performed by: FAMILY MEDICINE

## 2025-01-13 RX ORDER — NAPROXEN 500 MG/1
500 TABLET ORAL 2 TIMES DAILY WITH MEALS
Qty: 180 TABLET | Refills: 0 | Status: SHIPPED | OUTPATIENT
Start: 2025-01-13 | End: 2025-04-13

## 2025-01-13 NOTE — ASSESSMENT & PLAN NOTE
"SMOKING CESSATION      Take medication or use patch as instructed.    Set a quit date four weeks from start of the above.    The evening before your quit date, throw away your cigarettes, ashtrays, lighters, and anything else that has to do with smoking. The next morning you will wake up a nonsmoker.    Do the following:     Take these steps prior to quitting:  Buy cigarettes by the single pack only    Wait 10 minutes to smoke after an urge to do so    Don't smoke in the car    Smoke your least liked brand    Gradually cut down to a pack per day if you smoke more than that    Make a list of the times, places, and situations in which the urge to smoke is the greatest and then start avoiding these as much as possible    Think about quitting one day at a time, not for the rest of your life     Take these steps after quitting:   Avoid coffee which is often a trigger to smoke a cigarette. Try drinking water or another noncaffeinated beverage instead.    Avoid the times, places, and situations in which the urge to smoke is greatest, or make plans about how to deal with them so you won't smoke if they cannot be avoided.    Play a relaxation tape from time to time if you tended to smoke when anxious. You can buy one online.    If you are using a patch, rub it gently and say "This is all the nicotine I need" when the urge to smoke occurs    If you are using another method, take five deep breaths and exhale slowly when the urge to smoke occurs    Play with a pen to occupy the hand that usually has a cigarette in it    Suck air through a cut-off soda straw (cut to the length of a cigarette) when the urge to smoke is particularly strong    Call 1-800-QUIT-NOW (1-507.167.2805) for support when needed.   Quit Smoking Today - [place] [employer] of [place]     "

## 2025-01-13 NOTE — ASSESSMENT & PLAN NOTE
- monitor symptoms like numbness, weakness, altered sensation,   - monitor for redness, swelling, redness, decreased ROM, stiffness at the injection site  - continue oral pain NSAIDs for pain relief   - apply Diclofenac gel on the affected areas  - follow up if symptoms worsen or fail to improve  - discontinue OTC tylenol for pain relief dure to hepatitis c   - naproxen (NAPROSYN) 500 MG tablet 500 mg, 2 times daily with meals  - follow up in 3 months

## 2025-01-13 NOTE — PROGRESS NOTES
Yaneth Mcdonald MD MPH  [address], [place] [address]  Phone: [phone]     Subjective     Name: Jeff Starr   YOB: 1950 (74 y.o.)  MRN: [id number]  Visit Date: 1/13/2025   Chief Complaint: Hypertension (Room 10 ) and Establish Care (Room 10 establish care, hypertension, chronic hepatitis c )        HISTORY OF PRESENT ILLNESS:  Patient is a 75 yo male with PMH of OA, COPD, HTN, Allergic rhinitis, chronic pain syndrome, chronic hep c. He came to the clinic to establish care.  Patient was not in any acute distress during the encounter. Patient is compliant with the daily medications and doesn't experience any problems with the current medication regimen.  Patient denied any SOB, wheezing, chills, cough, fevers, palpitations, leg swelling, chest pain, gastrointestinal symptoms, genitourinary symptoms, or myalgias.            PAST MEDICAL HISTORY:  Significant Diagnoses - Patient  has a past medical history of Chronic pain syndrome (04/08/2023), COPD (chronic obstructive pulmonary disease), COVID, HTN (hypertension), Localized chronic periodontitis (03/21/2023), and Tobacco user (03/21/2023).  Medications - Patient has a current medication list which includes the following long-term medication(s): albuterol, amlodipine, loratadine, and albuterol.   Allergies - Patient has No Known Allergies.  Surgeries - Patient  has a past surgical history that includes Shoulder surgery (Right) and Hernia repair.  Family History - Patient family history includes Asthma in his sister; Coronary artery disease in his father; Diabetes in his father; Heart attack in his father; Hypertension in his father, mother, and sister.      SOCIAL HISTORY:  Tobacco - Patient  reports that he has been smoking cigarettes. He started smoking about 54 years ago. He has a 27 pack-year smoking history. He has been exposed to tobacco smoke. He has never used smokeless tobacco.   Alcohol - Patient  reports that he does not  currently use alcohol.   Recreational Drugs - Patient  reports current drug use. Drug: Marijuana.       Review of Systems   Constitutional:  Negative for activity change, appetite change, chills, fatigue and fever.   HENT:  Negative for nasal congestion, ear discharge, ear pain, hearing loss, postnasal drip, rhinorrhea, sinus pressure/congestion and sore throat.    Eyes:  Negative for discharge, itching and visual disturbance.   Respiratory:  Negative for cough, choking, chest tightness, shortness of breath and wheezing.    Cardiovascular:  Negative for chest pain, palpitations, leg swelling and claudication.   Gastrointestinal:  Negative for abdominal distention, abdominal pain, constipation, diarrhea, nausea, vomiting and reflux.   Genitourinary:  Negative for difficulty urinating, dysuria, frequency, hematuria and urgency.   Musculoskeletal:  Negative for arthralgias, joint swelling and myalgias.   Integumentary:  Negative for rash.   Neurological:  Negative for tremors, light-headedness, numbness and headaches.          Past Medical History:   Diagnosis Date    Chronic pain syndrome 04/08/2023    COPD (chronic obstructive pulmonary disease)     COVID     HTN (hypertension)     Localized chronic periodontitis 03/21/2023    Tobacco user 03/21/2023        Review of patient's allergies indicates:  No Known Allergies     Past Surgical History:   Procedure Laterality Date    HERNIA REPAIR      SHOULDER SURGERY Right         Family History   Problem Relation Name Age of Onset    Hypertension Mother      Diabetes Father      Hypertension Father      Coronary artery disease Father      Heart attack Father      Asthma Sister      Hypertension Sister         Current Outpatient Medications   Medication Instructions    acetaminophen (TYLENOL) 650 MG TbSR Tylenol Arthritis Pain    albuterol (PROVENTIL) 2.5 mg, Nebulization, Every 6 hours PRN, Rescue    albuterol (PROVENTIL/VENTOLIN HFA) 90 mcg/actuation inhaler 1 puff,  "Inhalation, Every 4 hours PRN    amLODIPine (NORVASC) 10 mg, Oral, Daily    ipratropium (ATROVENT) 21 mcg (0.03 %) nasal spray 2 sprays, Each Nostril, 2 times daily    LIDOcaine (LMX) 4 % cream Topical (Top), As needed (PRN)    loratadine (CLARITIN) 10 mg, Oral, Daily    naproxen (NAPROSYN) 500 mg, Oral, 2 times daily with meals    nirmatrelvir-ritonavir (PAXLOVID) 300 mg (150 mg x 2)-100 mg copackaged tablets (EUA) Take 3 tablets by mouth 2 (two) times daily. Each dose contains 2 nirmatrelvir (pink tablets) and 1 ritonavir (white tablet). Take all 3 tablets together        Objective     /78 (BP Location: Left arm, Patient Position: Sitting)   Pulse 77   Temp 98.4 °F (36.9 °C) (Oral)   Resp 18   Ht 5' 3" (1.6 m)   Wt 66.7 kg (147 lb)   SpO2 98%   BMI 26.04 kg/m²     Physical Exam  Vitals and nursing note reviewed.   Constitutional:       Appearance: Normal appearance. He is normal weight.   HENT:      Head: Normocephalic and atraumatic.      Right Ear: Tympanic membrane and ear canal normal.      Left Ear: Tympanic membrane and ear canal normal.      Nose: Nose normal.      Mouth/Throat:      Mouth: Mucous membranes are moist.      Pharynx: Oropharynx is clear.   Eyes:      Extraocular Movements: Extraocular movements intact.      Conjunctiva/sclera: Conjunctivae normal.      Pupils: Pupils are equal, round, and reactive to light.   Cardiovascular:      Rate and Rhythm: Normal rate and regular rhythm.      Pulses: Normal pulses.      Heart sounds: Normal heart sounds. No murmur heard.  Pulmonary:      Effort: Pulmonary effort is normal. No respiratory distress.      Breath sounds: Normal breath sounds. No wheezing.   Abdominal:      General: Bowel sounds are normal. There is no distension.      Palpations: Abdomen is soft.      Tenderness: There is no abdominal tenderness. There is no guarding.   Musculoskeletal:      Cervical back: Normal range of motion and neck supple.   Skin:     Capillary Refill: " Capillary refill takes less than 2 seconds.   Neurological:      General: No focal deficit present.      Mental Status: He is alert and oriented to person, place, and time. Mental status is at baseline.   Psychiatric:         Mood and Affect: Mood normal.         Behavior: Behavior normal.         Thought Content: Thought content normal.         Judgment: Judgment normal.          All recently obtained labs have been reviewed and discussed in detail with the patient.   Assessment     1. Chronic pain syndrome    2. Osteoarthritis of multiple joints, unspecified osteoarthritis type    3. Tobacco abuse counseling         Plan     Problem List Items Addressed This Visit       Osteoarthritis of multiple joints (Chronic)     - monitor symptoms like numbness, weakness, altered sensation,   - monitor for redness, swelling, redness, decreased ROM, stiffness at the injection site  - continue oral pain NSAIDs for pain relief   - apply Diclofenac gel on the affected areas  - follow up if symptoms worsen or fail to improve  - discontinue OTC tylenol for pain relief dure to hepatitis c   - naproxen (NAPROSYN) 500 MG tablet 500 mg, 2 times daily with meals  - follow up in 3 months           Relevant Medications    naproxen (NAPROSYN) 500 MG tablet    Chronic pain syndrome - Primary (Chronic)     Under care of Dr Jeff Starr  He has a Marijuana card as well           Tobacco abuse counseling     SMOKING CESSATION      Take medication or use patch as instructed.    Set a quit date four weeks from start of the above.    The evening before your quit date, throw away your cigarettes, ashtrays, lighters, and anything else that has to do with smoking. The next morning you will wake up a nonsmoker.    Do the following:     Take these steps prior to quitting:  Buy cigarettes by the single pack only    Wait 10 minutes to smoke after an urge to do so    Don't smoke in the car    Smoke your least liked brand    Gradually cut down to a pack  "per day if you smoke more than that    Make a list of the times, places, and situations in which the urge to smoke is the greatest and then start avoiding these as much as possible    Think about quitting one day at a time, not for the rest of your life     Take these steps after quitting:   Avoid coffee which is often a trigger to smoke a cigarette. Try drinking water or another noncaffeinated beverage instead.    Avoid the times, places, and situations in which the urge to smoke is greatest, or make plans about how to deal with them so you won't smoke if they cannot be avoided.    Play a relaxation tape from time to time if you tended to smoke when anxious. You can buy one online.    If you are using a patch, rub it gently and say "This is all the nicotine I need" when the urge to smoke occurs    If you are using another method, take five deep breaths and exhale slowly when the urge to smoke occurs    Play with a pen to occupy the hand that usually has a cigarette in it    Suck air through a cut-off soda straw (cut to the length of a cigarette) when the urge to smoke is particularly strong    Call 9-022-QUIT-NOW (1-598.176.7518) for support when needed.   Quit Smoking Today - [place] [employer] of [place]                 All orders:  Orders Placed This Encounter    naproxen (NAPROSYN) 500 MG tablet          Follow up in about 3 months (around 4/13/2025).    Yaneth Mcdonald MD MPH       "

## 2025-02-13 ENCOUNTER — OFFICE VISIT (OUTPATIENT)
Dept: GASTROENTEROLOGY | Facility: CLINIC | Age: 75
End: 2025-02-13
Payer: MEDICARE

## 2025-02-13 VITALS
DIASTOLIC BLOOD PRESSURE: 78 MMHG | WEIGHT: 145.81 LBS | OXYGEN SATURATION: 99 % | BODY MASS INDEX: 25.84 KG/M2 | SYSTOLIC BLOOD PRESSURE: 140 MMHG | HEART RATE: 76 BPM | HEIGHT: 63 IN

## 2025-02-13 DIAGNOSIS — B18.2 CHRONIC HEPATITIS C WITHOUT HEPATIC COMA: Primary | ICD-10-CM

## 2025-02-13 PROCEDURE — 99214 OFFICE O/P EST MOD 30 MIN: CPT | Mod: PBBFAC

## 2025-02-13 PROCEDURE — 99213 OFFICE O/P EST LOW 20 MIN: CPT | Mod: S$PBB,,,

## 2025-02-13 PROCEDURE — 3288F FALL RISK ASSESSMENT DOCD: CPT | Mod: CPTII,,,

## 2025-02-13 PROCEDURE — 1101F PT FALLS ASSESS-DOCD LE1/YR: CPT | Mod: CPTII,,,

## 2025-02-13 PROCEDURE — 3078F DIAST BP <80 MM HG: CPT | Mod: CPTII,,,

## 2025-02-13 PROCEDURE — 99999 PR PBB SHADOW E&M-EST. PATIENT-LVL IV: CPT | Mod: PBBFAC,,,

## 2025-02-13 PROCEDURE — 3008F BODY MASS INDEX DOCD: CPT | Mod: CPTII,,,

## 2025-02-13 PROCEDURE — 1160F RVW MEDS BY RX/DR IN RCRD: CPT | Mod: CPTII,,,

## 2025-02-13 PROCEDURE — 3077F SYST BP >= 140 MM HG: CPT | Mod: CPTII,,,

## 2025-02-13 PROCEDURE — 1159F MED LIST DOCD IN RCRD: CPT | Mod: CPTII,,,

## 2025-02-13 NOTE — PROGRESS NOTES
Gastroenterology Clinic Note    Patient ID: [id number]   Referring MD: No ref. provider found   Chief Complaint:   Chief Complaint   Patient presents with    Follow-up       History of Present Illness   Jeff Starr is an 74 y.o. AAM who is referred for hepatitis-C.  Patient recently diagnosed with chronic HCV by PCP.  Denies any known history of hepatitis or liver disease.  He has no GI related complaints on exam today.  Denies use of alcohol or illicit drugs.  No prior treatment for HCV.  He presented to clinic prepped for colonoscopy that he thought was rescheduled to today.    Previous workup:  Ultrasound abdomen limited liver and elastography    Last colonoscopy was 06/17/2024 with 3 year recall for screening purposes.    Interval  - Epclusa therapy was started 08/2024  - he completed treatment with no issues  - doing well overall at follow-up     Review of Systems   Constitutional:  Negative for weight loss.   Gastrointestinal:  Negative for abdominal pain, blood in stool, constipation, diarrhea, heartburn, melena, nausea and vomiting.       Past Medical History      Past Medical History:   Diagnosis Date    Chronic pain syndrome 04/08/2023    COPD (chronic obstructive pulmonary disease)     COVID     HTN (hypertension)     Localized chronic periodontitis 03/21/2023    Tobacco user 03/21/2023       Past Surgical History     Past Surgical History:   Procedure Laterality Date    HERNIA REPAIR      SHOULDER SURGERY Right        Allergies   Review of patient's allergies indicates:  No Known Allergies    Immunization History     Immunization History   Administered Date(s) Administered    COVID-19 MRNA, LN-S PF (MODERNA HALF 0.25 ML DOSE) 11/04/2021, 06/16/2022    COVID-19, MRNA, LN-S, PF (MODERNA FULL 0.5 ML DOSE) 01/20/2021, 02/26/2021    COVID-19, mRNA, LNP-S, PF (Moderna) Ages 12+ 10/31/2024    Influenza (FLUAD) - Quadrivalent - Adjuvanted - PF *Preferred* (65+) 11/16/2022    Influenza - Trivalent - Fluzone  High Dose - PF (65 years and older) 10/31/2024    Pneumococcal Conjugate - 20 Valent 11/16/2022    Tdap 07/13/2015       Past Family History      Family History   Problem Relation Name Age of Onset    Hypertension Mother      Diabetes Father      Hypertension Father      Coronary artery disease Father      Heart attack Father      Asthma Sister      Hypertension Sister         Past Social History      Social History     Socioeconomic History    Marital status:    Tobacco Use    Smoking status: Every Day     Current packs/day: 0.50     Average packs/day: 0.5 packs/day for 54.1 years (27.1 ttl pk-yrs)     Types: Cigarettes     Start date: 1971     Passive exposure: Current    Smokeless tobacco: Never   Substance and Sexual Activity    Alcohol use: Not Currently    Drug use: Yes     Types: Marijuana     Comment: occasional per pain    Sexual activity: Yes     Social Drivers of Health     Financial Resource Strain: Low Risk  (11/3/2023)    Overall Financial Resource Strain (CARDIA)     Difficulty of Paying Living Expenses: Not hard at all   Food Insecurity: No Food Insecurity (11/3/2023)    Hunger Vital Sign     Worried About Running Out of Food in the Last Year: Never true     Ran Out of Food in the Last Year: Never true   Transportation Needs: No Transportation Needs (11/3/2023)    PRAPARE - Transportation     Lack of Transportation (Medical): No     Lack of Transportation (Non-Medical): No   Physical Activity: Inactive (11/3/2023)    Exercise Vital Sign     Days of Exercise per Week: 0 days     Minutes of Exercise per Session: 0 min   Stress: No Stress Concern Present (11/3/2023)    [employer] - Occupational Stress Questionnaire     Feeling of Stress : Not at all   Housing Stability: Low Risk  (11/3/2023)    Housing Stability Vital Sign     Unable to Pay for Housing in the Last Year: No     Number of Places Lived in the Last Year: 1     Unstable Housing in the Last Year: No  "      Current Medications     Outpatient Medications Marked as Taking for the 2/13/25 encounter (Office Visit) with Ada Tierney FNP   Medication Sig Dispense Refill    acetaminophen (TYLENOL) 650 MG TbSR Tylenol Arthritis Pain      albuterol (PROVENTIL) 2.5 mg /3 mL (0.083 %) nebulizer solution Take 3 mLs (2.5 mg total) by nebulization every 6 (six) hours as needed for Wheezing or Shortness of Breath. Rescue 60 mL 5    albuterol (PROVENTIL/VENTOLIN HFA) 90 mcg/actuation inhaler Inhale 1 puff into the lungs every 4 (four) hours as needed for Shortness of Breath. 18 g 3    amLODIPine (NORVASC) 10 MG tablet Take 1 tablet (10 mg total) by mouth once daily. 90 tablet 1    ipratropium (ATROVENT) 21 mcg (0.03 %) nasal spray 2 sprays by Each Nostril route 2 (two) times daily. 30 mL 3    LIDOcaine (LMX) 4 % cream Apply topically as needed (shoulder). 28 g 2    loratadine (CLARITIN) 10 mg tablet Take 1 tablet (10 mg total) by mouth once daily. 90 tablet 1    naproxen (NAPROSYN) 500 MG tablet Take 1 tablet (500 mg total) by mouth 2 (two) times daily with meals. 180 tablet 0    nirmatrelvir-ritonavir (PAXLOVID) 300 mg (150 mg x 2)-100 mg copackaged tablets (EUA) Take 3 tablets by mouth 2 (two) times daily. Each dose contains 2 nirmatrelvir (pink tablets) and 1 ritonavir (white tablet). Take all 3 tablets together 30 tablet 0        I have reviewed the current medications, allergies, vital signs, past medical and surgical history, family medical history, and social history for this encounter and agree with all findings.    OBJECTIVE    Physical Exam    BP (!) 140/78 (BP Location: Left arm, Patient Position: Sitting)   Pulse 76   Ht 5' 3" (1.6 m)   Wt 66.1 kg (145 lb 12.8 oz)   SpO2 99%   BMI 25.83 kg/m²   GEN: Well appearing, cooperative, NAD  NECK: Supple, no LAD  CV: Normal rate  RESP: Unlabored  ABD: ND, NT, soft, no guarding  EXT: No clubbing, cyanosis, or edema  SKIN: Warm and dry  NEURO: AAO x4.     LABS    CBC " (with or without Differential):   Lab Results   Component Value Date    WBC 4.07 (L) 06/18/2024    HGB 13.2 (L) 06/18/2024    HCT 39.7 (L) 06/18/2024    MCV 97.8 (H) 06/18/2024    MCH 32.5 (H) 06/18/2024    MCHC 33.2 06/18/2024    RDW 11.7 06/18/2024     (L) 06/18/2024    MPV 10.9 06/18/2024    NEUTOPHILPCT 33.3 (L) 06/18/2024    DIFFTYPE Auto 06/18/2024     BMP/CMP:   Lab Results   Component Value Date     09/18/2024    K 4.1 09/18/2024     09/18/2024    CO2 26 09/18/2024    BUN 17 09/18/2024    CREATININE 1.23 09/18/2024    GLU 92 09/18/2024    CALCIUM 8.9 09/18/2024    ALBUMIN 3.4 (L) 09/18/2024    AST 20 09/18/2024    ALT 18 09/18/2024    ALKPHOS 78 09/18/2024    MG 2.2 03/11/2020        IMAGING  Ultrasound abdomen limited liver 07/2024  - simple appearing right renal cyst; no other evidence of abnormality demonstrated    Ultrasound elastography liver 07/2024  - Metavir score F0-1    ASSESSMENT  Jeff Starr is a 74 y.o. AAM with history of COPD and hypertension who is referred for follow-up of hepatitis-C.    1. Chronic hepatitis C without hepatic coma           PLAN    - labs today as detailed below  - return to GI clinic for follow-up as needed     Patient Instructions   You will be due for screening colonoscopy in June of 2027.        Orders Placed This Encounter   Procedures    Hepatitis C Virus (HCV) RNA Detection and Quantification, RT-PCR     Standing Status:   Future     Standing Expiration Date:   5/14/2026    CBC Auto Differential     Standing Status:   Future     Standing Expiration Date:   4/14/2026    Comprehensive Metabolic Panel     Standing Status:   Future     Standing Expiration Date:   4/14/2026         The risks and benefits of my recommendations, as well as other treatment options were discussed with the patient today. All questions were answered.    25 minutes of total time spent on the encounter, which includes face to face time and non-face to face time preparing  to see the patient (eg, review of tests), obtaining and/or reviewing separately obtained history, documenting clinical information in the electronic or other health record, Independently interpreting results (not separately reported) and communicating results to the patient/family/caregiver, or care coordination (not separately reported).          Ada Tierney, FNP/ACNP  [facility]

## 2025-02-17 ENCOUNTER — RESULTS FOLLOW-UP (OUTPATIENT)
Dept: GASTROENTEROLOGY | Facility: CLINIC | Age: 75
End: 2025-02-17
Payer: MEDICARE

## 2025-02-18 NOTE — TELEPHONE ENCOUNTER
----- Message from SKY De Jesus sent at 2/17/2025  1:36 PM CST -----  His HCV test of cure is undetected indicating treatment was successful.  ----- Message -----  From: Lab, Background User  Sent: 2/13/2025   5:21 PM CST  To: SKY De Jesus

## 2025-02-18 NOTE — TELEPHONE ENCOUNTER
Patient's sister aware, verbalized good understanding. Informed her that if he has any questions to let us know.

## 2025-04-09 ENCOUNTER — OFFICE VISIT (OUTPATIENT)
Dept: FAMILY MEDICINE | Facility: CLINIC | Age: 75
End: 2025-04-09
Payer: MEDICARE

## 2025-04-09 VITALS
HEART RATE: 65 BPM | SYSTOLIC BLOOD PRESSURE: 126 MMHG | OXYGEN SATURATION: 99 % | RESPIRATION RATE: 18 BRPM | TEMPERATURE: 98 F | DIASTOLIC BLOOD PRESSURE: 75 MMHG | BODY MASS INDEX: 24.95 KG/M2 | WEIGHT: 140.81 LBS | HEIGHT: 63 IN

## 2025-04-09 DIAGNOSIS — J30.89 SEASONAL ALLERGIC RHINITIS DUE TO OTHER ALLERGIC TRIGGER: Primary | ICD-10-CM

## 2025-04-09 RX ORDER — IPRATROPIUM BROMIDE 21 UG/1
2 SPRAY, METERED NASAL 2 TIMES DAILY
Qty: 30 ML | Refills: 3 | Status: SHIPPED | OUTPATIENT
Start: 2025-04-09

## 2025-04-09 NOTE — ASSESSMENT & PLAN NOTE
- monitor for worsening of symptoms and failing of symptoms' relief  - look out for allergens that make symptoms appear  - avoid exposure to allergens as much as possible    - ipratropium (ATROVENT) 21 mcg (0.03 %) nasal spray 2 spray, 2 times daily    - follow up in 3 month

## 2025-04-09 NOTE — PROGRESS NOTES
Yaneth Mcdonald MD MPH  [address], [place] [address]  Phone: [phone]     Subjective     Name: Jeff Starr   YOB: 1950 (74 y.o.)  MRN: [id number]  Visit Date: 4/9/2025   Chief Complaint: Hypertension (Room 4 3 month f/u re HTN, COPD, medicine refills )        HISTORY OF PRESENT ILLNESS:  Patient is a 73 yo male with PMH of OA, COPD, HTN, Allergic rhinitis, chronic pain syndrome, chronic hep c. He came to the clinic for a follow up visit and medications refill.  Patient was not in any acute distress during the encounter. Patient is compliant with the daily medications and doesn't experience any problems with the current medication regimen.  Patient denied any SOB, wheezing, chills, cough, fevers, palpitations, leg swelling, chest pain, gastrointestinal symptoms, genitourinary symptoms, or myalgias.  Patient has completed Hep c treatment with GI.        PAST MEDICAL HISTORY:  Significant Diagnoses - Patient  has a past medical history of Chronic pain syndrome (04/08/2023), COPD (chronic obstructive pulmonary disease), COVID, HTN (hypertension), Localized chronic periodontitis (03/21/2023), and Tobacco user (03/21/2023).  Medications - Patient has a current medication list which includes the following long-term medication(s): albuterol, albuterol, and amlodipine.   Allergies - Patient has no known allergies.  Surgeries - Patient  has a past surgical history that includes Shoulder surgery (Right) and Hernia repair.  Family History - Patient family history includes Asthma in his sister; Coronary artery disease in his father; Diabetes in his father; Heart attack in his father; Hypertension in his father, mother, and sister.      SOCIAL HISTORY:  Tobacco - Patient  reports that he has been smoking cigarettes. He started smoking about 54 years ago. He has a 27.1 pack-year smoking history. He has been exposed to tobacco smoke. He has never used smokeless tobacco.   Alcohol - Patient   reports that he does not currently use alcohol.   Recreational Drugs - Patient  reports current drug use. Drug: Marijuana.       Review of Systems   Constitutional:  Negative for activity change, appetite change, chills, fatigue and fever.   HENT:  Negative for nasal congestion, ear discharge, ear pain, hearing loss, postnasal drip, rhinorrhea, sinus pressure/congestion and sore throat.    Eyes:  Negative for discharge, itching and visual disturbance.   Respiratory:  Negative for cough, choking, chest tightness, shortness of breath and wheezing.    Cardiovascular:  Negative for chest pain, palpitations, leg swelling and claudication.   Gastrointestinal:  Negative for abdominal distention, abdominal pain, constipation, diarrhea, nausea, vomiting and reflux.   Genitourinary:  Negative for difficulty urinating, dysuria, frequency, hematuria and urgency.   Musculoskeletal:  Negative for arthralgias, joint swelling and myalgias.   Integumentary:  Negative for rash.   Neurological:  Negative for tremors, light-headedness, numbness and headaches.          Past Medical History:   Diagnosis Date    Chronic pain syndrome 04/08/2023    COPD (chronic obstructive pulmonary disease)     COVID     HTN (hypertension)     Localized chronic periodontitis 03/21/2023    Tobacco user 03/21/2023        Review of patient's allergies indicates:  No Known Allergies     Past Surgical History:   Procedure Laterality Date    HERNIA REPAIR      SHOULDER SURGERY Right         Family History   Problem Relation Name Age of Onset    Hypertension Mother      Diabetes Father      Hypertension Father      Coronary artery disease Father      Heart attack Father      Asthma Sister      Hypertension Sister         Current Outpatient Medications   Medication Instructions    acetaminophen (TYLENOL) 650 MG TbSR Tylenol Arthritis Pain    albuterol (PROVENTIL) 2.5 mg, Nebulization, Every 6 hours PRN, Rescue    albuterol (PROVENTIL/VENTOLIN HFA) 90  "mcg/actuation inhaler 1 puff, Inhalation, Every 4 hours PRN    amLODIPine (NORVASC) 10 mg, Oral, Daily    ipratropium (ATROVENT) 21 mcg (0.03 %) nasal spray 2 sprays, Each Nostril, 2 times daily    LIDOcaine (LMX) 4 % cream Topical (Top), As needed (PRN)        Objective     /75 (BP Location: Left arm, Patient Position: Sitting)   Pulse 65   Temp 98.4 °F (36.9 °C) (Oral)   Resp 18   Ht 5' 3" (1.6 m)   Wt 63.9 kg (140 lb 12.8 oz)   SpO2 99%   BMI 24.94 kg/m²     Physical Exam  Vitals and nursing note reviewed.   Constitutional:       Appearance: Normal appearance. He is normal weight.   HENT:      Head: Normocephalic and atraumatic.      Right Ear: Tympanic membrane and ear canal normal.      Left Ear: Tympanic membrane and ear canal normal.      Nose: Nose normal.      Mouth/Throat:      Mouth: Mucous membranes are moist.      Pharynx: Oropharynx is clear.   Eyes:      Extraocular Movements: Extraocular movements intact.      Conjunctiva/sclera: Conjunctivae normal.      Pupils: Pupils are equal, round, and reactive to light.   Cardiovascular:      Rate and Rhythm: Normal rate and regular rhythm.      Pulses: Normal pulses.      Heart sounds: Normal heart sounds. No murmur heard.  Pulmonary:      Effort: Pulmonary effort is normal. No respiratory distress.      Breath sounds: Normal breath sounds. No wheezing.   Abdominal:      General: Bowel sounds are normal. There is no distension.      Palpations: Abdomen is soft.      Tenderness: There is no abdominal tenderness. There is no guarding.   Musculoskeletal:      Cervical back: Normal range of motion and neck supple.   Skin:     Capillary Refill: Capillary refill takes less than 2 seconds.   Neurological:      General: No focal deficit present.      Mental Status: He is alert and oriented to person, place, and time. Mental status is at baseline.   Psychiatric:         Mood and Affect: Mood normal.         Behavior: Behavior normal.         Thought " Content: Thought content normal.         Judgment: Judgment normal.          All recently obtained labs have been reviewed and discussed in detail with the patient.   Assessment     1. Seasonal allergic rhinitis due to other allergic trigger         Plan     Problem List Items Addressed This Visit       Seasonal allergic rhinitis - Primary    - monitor for worsening of symptoms and failing of symptoms' relief  - look out for allergens that make symptoms appear  - avoid exposure to allergens as much as possible    - ipratropium (ATROVENT) 21 mcg (0.03 %) nasal spray 2 spray, 2 times daily    - follow up in 3 month           Relevant Medications    ipratropium (ATROVENT) 21 mcg (0.03 %) nasal spray         All orders:  Orders Placed This Encounter    ipratropium (ATROVENT) 21 mcg (0.03 %) nasal spray          Follow up in about 3 months (around 7/7/2025).    Yaneth Mcdonald MD MPH

## 2025-05-13 ENCOUNTER — HOSPITAL ENCOUNTER (OUTPATIENT)
Dept: RADIOLOGY | Facility: HOSPITAL | Age: 75
Discharge: HOME | End: 2025-05-13
Attending: FAMILY MEDICINE
Payer: MEDICARE

## 2025-05-13 DIAGNOSIS — M79.672 BILATERAL FOOT PAIN: ICD-10-CM

## 2025-05-13 DIAGNOSIS — M79.671 BILATERAL FOOT PAIN: ICD-10-CM

## 2025-05-13 PROCEDURE — 73630 X-RAY EXAM OF FOOT: CPT | Mod: TC,50

## 2025-05-13 PROCEDURE — 73630 X-RAY EXAM OF FOOT: CPT | Mod: 26,50,, | Performed by: RADIOLOGY

## (undated) RX ORDER — PREDNISOLONE ACETATE 10 MG/ML: 1 SUSPENSION/ DROPS OPHTHALMIC

## (undated) RX ORDER — CYCLOPENTOLATE HYDROCHLORIDE 10 MG/ML: 1 SOLUTION OPHTHALMIC

## (undated) RX ORDER — POLYMYXIN B SULFATE AND TRIMETHOPRIM 1; 10000 MG/ML; [USP'U]/ML: 1 SOLUTION OPHTHALMIC